# Patient Record
Sex: MALE | Race: WHITE | NOT HISPANIC OR LATINO | Employment: FULL TIME | URBAN - METROPOLITAN AREA
[De-identification: names, ages, dates, MRNs, and addresses within clinical notes are randomized per-mention and may not be internally consistent; named-entity substitution may affect disease eponyms.]

---

## 2023-06-09 LAB
ALANINE AMINOTRANSFERASE (SGPT) (U/L) IN SER/PLAS: 20 U/L (ref 10–52)
ALBUMIN (G/DL) IN SER/PLAS: 4.2 G/DL (ref 3.4–5)
ALBUMIN (MG/L) IN URINE: 206.5 MG/L
ALBUMIN/CREATININE (UG/MG) IN URINE: 109.3 UG/MG CRT (ref 0–30)
ALKALINE PHOSPHATASE (U/L) IN SER/PLAS: 58 U/L (ref 33–136)
ANION GAP IN SER/PLAS: 16 MMOL/L (ref 10–20)
APPEARANCE, URINE: CLEAR
ASPARTATE AMINOTRANSFERASE (SGOT) (U/L) IN SER/PLAS: 17 U/L (ref 9–39)
BASOPHILS (10*3/UL) IN BLOOD BY AUTOMATED COUNT: 0.09 X10E9/L (ref 0–0.1)
BASOPHILS/100 LEUKOCYTES IN BLOOD BY AUTOMATED COUNT: 1.1 % (ref 0–2)
BILIRUBIN TOTAL (MG/DL) IN SER/PLAS: 0.4 MG/DL (ref 0–1.2)
BILIRUBIN, URINE: NEGATIVE
BLOOD, URINE: NEGATIVE
CALCIDIOL (25 OH VITAMIN D3) (NG/ML) IN SER/PLAS: 46 NG/ML
CALCIUM (MG/DL) IN SER/PLAS: 9.3 MG/DL (ref 8.6–10.3)
CARBON DIOXIDE, TOTAL (MMOL/L) IN SER/PLAS: 25 MMOL/L (ref 21–32)
CHLORIDE (MMOL/L) IN SER/PLAS: 101 MMOL/L (ref 98–107)
CHOLESTEROL (MG/DL) IN SER/PLAS: 129 MG/DL (ref 0–199)
CHOLESTEROL IN HDL (MG/DL) IN SER/PLAS: 41.3 MG/DL
CHOLESTEROL IN LDL (MG/DL) IN SER/PLAS BY DIRECT ASSAY: 75 MG/DL (ref 0–129)
CHOLESTEROL/HDL RATIO: 3.1
COBALAMIN (VITAMIN B12) (PG/ML) IN SER/PLAS: 386 PG/ML (ref 211–911)
COLOR, URINE: YELLOW
CREATININE (MG/DL) IN SER/PLAS: 0.83 MG/DL (ref 0.5–1.3)
CREATININE (MG/DL) IN URINE: 189 MG/DL (ref 20–370)
EOSINOPHILS (10*3/UL) IN BLOOD BY AUTOMATED COUNT: 0.22 X10E9/L (ref 0–0.7)
EOSINOPHILS/100 LEUKOCYTES IN BLOOD BY AUTOMATED COUNT: 2.7 % (ref 0–6)
ERYTHROCYTE DISTRIBUTION WIDTH (RATIO) BY AUTOMATED COUNT: 13.2 % (ref 11.5–14.5)
ERYTHROCYTE MEAN CORPUSCULAR HEMOGLOBIN CONCENTRATION (G/DL) BY AUTOMATED: 33.6 G/DL (ref 32–36)
ERYTHROCYTE MEAN CORPUSCULAR VOLUME (FL) BY AUTOMATED COUNT: 93 FL (ref 80–100)
ERYTHROCYTES (10*6/UL) IN BLOOD BY AUTOMATED COUNT: 4.58 X10E12/L (ref 4.5–5.9)
ESTIMATED AVERAGE GLUCOSE FOR HBA1C: 117 MG/DL
GFR MALE: >90 ML/MIN/1.73M2
GLUCOSE (MG/DL) IN SER/PLAS: 96 MG/DL (ref 74–99)
GLUCOSE, URINE: NEGATIVE MG/DL
HEMATOCRIT (%) IN BLOOD BY AUTOMATED COUNT: 42.8 % (ref 41–52)
HEMOGLOBIN (G/DL) IN BLOOD: 14.4 G/DL (ref 13.5–17.5)
HEMOGLOBIN A1C/HEMOGLOBIN TOTAL IN BLOOD: 5.7 %
IMMATURE GRANULOCYTES/100 LEUKOCYTES IN BLOOD BY AUTOMATED COUNT: 0.8 % (ref 0–0.9)
KETONES, URINE: NEGATIVE MG/DL
LDL: 61 MG/DL (ref 0–99)
LEUKOCYTE ESTERASE, URINE: ABNORMAL
LEUKOCYTES (10*3/UL) IN BLOOD BY AUTOMATED COUNT: 8.2 X10E9/L (ref 4.4–11.3)
LYMPHOCYTES (10*3/UL) IN BLOOD BY AUTOMATED COUNT: 2.14 X10E9/L (ref 1.2–4.8)
LYMPHOCYTES/100 LEUKOCYTES IN BLOOD BY AUTOMATED COUNT: 26 % (ref 13–44)
MONOCYTES (10*3/UL) IN BLOOD BY AUTOMATED COUNT: 0.66 X10E9/L (ref 0.1–1)
MONOCYTES/100 LEUKOCYTES IN BLOOD BY AUTOMATED COUNT: 8 % (ref 2–10)
MUCUS, URINE: ABNORMAL /LPF
NEUTROPHILS (10*3/UL) IN BLOOD BY AUTOMATED COUNT: 5.06 X10E9/L (ref 1.2–7.7)
NEUTROPHILS/100 LEUKOCYTES IN BLOOD BY AUTOMATED COUNT: 61.4 % (ref 40–80)
NITRITE, URINE: NEGATIVE
PH, URINE: 6 (ref 5–8)
PLATELETS (10*3/UL) IN BLOOD AUTOMATED COUNT: 206 X10E9/L (ref 150–450)
POTASSIUM (MMOL/L) IN SER/PLAS: 3.7 MMOL/L (ref 3.5–5.3)
PROSTATE SPECIFIC AG (NG/ML) IN SER/PLAS: 4.48 NG/ML (ref 0–4)
PROTEIN TOTAL: 7.2 G/DL (ref 6.4–8.2)
PROTEIN, URINE: ABNORMAL MG/DL
RBC, URINE: 1 /HPF (ref 0–5)
SODIUM (MMOL/L) IN SER/PLAS: 138 MMOL/L (ref 136–145)
SPECIFIC GRAVITY, URINE: 1.02 (ref 1–1.03)
SQUAMOUS EPITHELIAL CELLS, URINE: 1 /HPF
THYROTROPIN (MIU/L) IN SER/PLAS BY DETECTION LIMIT <= 0.05 MIU/L: 1.45 MIU/L (ref 0.44–3.98)
TRIGLYCERIDE (MG/DL) IN SER/PLAS: 135 MG/DL (ref 0–149)
UREA NITROGEN (MG/DL) IN SER/PLAS: 13 MG/DL (ref 6–23)
UROBILINOGEN, URINE: <2 MG/DL (ref 0–1.9)
VLDL: 27 MG/DL (ref 0–40)
WBC, URINE: 19 /HPF (ref 0–5)

## 2023-11-27 ENCOUNTER — ANCILLARY PROCEDURE (OUTPATIENT)
Dept: RADIOLOGY | Facility: CLINIC | Age: 63
End: 2023-11-27
Payer: COMMERCIAL

## 2023-11-27 ENCOUNTER — LAB (OUTPATIENT)
Dept: LAB | Facility: LAB | Age: 63
End: 2023-11-27
Payer: COMMERCIAL

## 2023-11-27 DIAGNOSIS — M25.569 PAIN IN UNSPECIFIED KNEE: ICD-10-CM

## 2023-11-27 DIAGNOSIS — E11.9 TYPE 2 DIABETES MELLITUS WITHOUT COMPLICATIONS (MULTI): ICD-10-CM

## 2023-11-27 DIAGNOSIS — R97.20 ELEVATED PROSTATE SPECIFIC ANTIGEN (PSA): Primary | ICD-10-CM

## 2023-11-27 LAB
ANION GAP SERPL CALC-SCNC: 10 MMOL/L (ref 10–20)
BUN SERPL-MCNC: 17 MG/DL (ref 6–23)
CALCIUM SERPL-MCNC: 9 MG/DL (ref 8.6–10.3)
CHLORIDE SERPL-SCNC: 102 MMOL/L (ref 98–107)
CO2 SERPL-SCNC: 31 MMOL/L (ref 21–32)
CREAT SERPL-MCNC: 0.79 MG/DL (ref 0.5–1.3)
EST. AVERAGE GLUCOSE BLD GHB EST-MCNC: 103 MG/DL
GFR SERPL CREATININE-BSD FRML MDRD: >90 ML/MIN/1.73M*2
GLUCOSE SERPL-MCNC: 102 MG/DL (ref 74–99)
HBA1C MFR BLD: 5.2 %
POTASSIUM SERPL-SCNC: 3.9 MMOL/L (ref 3.5–5.3)
PSA SERPL-MCNC: 0.42 NG/ML
SODIUM SERPL-SCNC: 139 MMOL/L (ref 136–145)

## 2023-11-27 PROCEDURE — 73564 X-RAY EXAM KNEE 4 OR MORE: CPT | Mod: BILATERAL PROCEDURE | Performed by: STUDENT IN AN ORGANIZED HEALTH CARE EDUCATION/TRAINING PROGRAM

## 2023-11-27 PROCEDURE — 80048 BASIC METABOLIC PNL TOTAL CA: CPT

## 2023-11-27 PROCEDURE — 84153 ASSAY OF PSA TOTAL: CPT

## 2023-11-27 PROCEDURE — 73564 X-RAY EXAM KNEE 4 OR MORE: CPT | Mod: 50

## 2023-11-27 PROCEDURE — 83036 HEMOGLOBIN GLYCOSYLATED A1C: CPT

## 2023-11-27 PROCEDURE — 36415 COLL VENOUS BLD VENIPUNCTURE: CPT

## 2024-05-25 ENCOUNTER — LAB (OUTPATIENT)
Dept: LAB | Facility: LAB | Age: 64
End: 2024-05-25
Payer: COMMERCIAL

## 2024-05-25 DIAGNOSIS — Z13.29 ENCOUNTER FOR SCREENING FOR OTHER SUSPECTED ENDOCRINE DISORDER: ICD-10-CM

## 2024-05-25 DIAGNOSIS — R73.03 PREDIABETES: ICD-10-CM

## 2024-05-25 DIAGNOSIS — I10 ESSENTIAL (PRIMARY) HYPERTENSION: ICD-10-CM

## 2024-05-25 DIAGNOSIS — Z13.220 ENCOUNTER FOR SCREENING FOR LIPOID DISORDERS: ICD-10-CM

## 2024-05-25 DIAGNOSIS — E55.9 VITAMIN D DEFICIENCY, UNSPECIFIED: Primary | ICD-10-CM

## 2024-05-25 DIAGNOSIS — K21.9 GASTRO-ESOPHAGEAL REFLUX DISEASE WITHOUT ESOPHAGITIS: ICD-10-CM

## 2024-05-25 LAB
25(OH)D3 SERPL-MCNC: 40 NG/ML (ref 30–100)
ALBUMIN SERPL BCP-MCNC: 4.2 G/DL (ref 3.4–5)
ALP SERPL-CCNC: 68 U/L (ref 33–136)
ALT SERPL W P-5'-P-CCNC: 12 U/L (ref 10–52)
ANION GAP SERPL CALC-SCNC: 15 MMOL/L (ref 10–20)
APPEARANCE UR: CLEAR
AST SERPL W P-5'-P-CCNC: 13 U/L (ref 9–39)
BACTERIA #/AREA URNS AUTO: ABNORMAL /HPF
BASOPHILS # BLD AUTO: 0.08 X10*3/UL (ref 0–0.1)
BASOPHILS NFR BLD AUTO: 1.1 %
BILIRUB SERPL-MCNC: 0.5 MG/DL (ref 0–1.2)
BILIRUB UR STRIP.AUTO-MCNC: NEGATIVE MG/DL
BUN SERPL-MCNC: 23 MG/DL (ref 6–23)
CALCIUM SERPL-MCNC: 9.3 MG/DL (ref 8.6–10.3)
CHLORIDE SERPL-SCNC: 106 MMOL/L (ref 98–107)
CHOLEST SERPL-MCNC: 116 MG/DL (ref 0–199)
CHOLESTEROL/HDL RATIO: 2.4
CO2 SERPL-SCNC: 21 MMOL/L (ref 21–32)
COLOR UR: YELLOW
CREAT SERPL-MCNC: 0.99 MG/DL (ref 0.5–1.3)
CREAT UR-MCNC: 214.8 MG/DL (ref 20–370)
EGFRCR SERPLBLD CKD-EPI 2021: 86 ML/MIN/1.73M*2
EOSINOPHIL # BLD AUTO: 0.29 X10*3/UL (ref 0–0.7)
EOSINOPHIL NFR BLD AUTO: 4.1 %
ERYTHROCYTE [DISTWIDTH] IN BLOOD BY AUTOMATED COUNT: 12.9 % (ref 11.5–14.5)
EST. AVERAGE GLUCOSE BLD GHB EST-MCNC: 111 MG/DL
GLUCOSE SERPL-MCNC: 87 MG/DL (ref 74–99)
GLUCOSE UR STRIP.AUTO-MCNC: NEGATIVE MG/DL
HBA1C MFR BLD: 5.5 %
HCT VFR BLD AUTO: 38.8 % (ref 41–52)
HDLC SERPL-MCNC: 48.9 MG/DL
HGB BLD-MCNC: 13.3 G/DL (ref 13.5–17.5)
HOLD SPECIMEN: NORMAL
IMM GRANULOCYTES # BLD AUTO: 0.06 X10*3/UL (ref 0–0.7)
IMM GRANULOCYTES NFR BLD AUTO: 0.8 % (ref 0–0.9)
KETONES UR STRIP.AUTO-MCNC: NEGATIVE MG/DL
LDLC SERPL CALC-MCNC: 47 MG/DL
LEUKOCYTE ESTERASE UR QL STRIP.AUTO: ABNORMAL
LYMPHOCYTES # BLD AUTO: 2.27 X10*3/UL (ref 1.2–4.8)
LYMPHOCYTES NFR BLD AUTO: 31.7 %
MCH RBC QN AUTO: 32.4 PG (ref 26–34)
MCHC RBC AUTO-ENTMCNC: 34.3 G/DL (ref 32–36)
MCV RBC AUTO: 94 FL (ref 80–100)
MICROALBUMIN UR-MCNC: 17.6 MG/L
MICROALBUMIN/CREAT UR: 8.2 UG/MG CREAT
MONOCYTES # BLD AUTO: 0.51 X10*3/UL (ref 0.1–1)
MONOCYTES NFR BLD AUTO: 7.1 %
MUCOUS THREADS #/AREA URNS AUTO: ABNORMAL /LPF
NEUTROPHILS # BLD AUTO: 3.94 X10*3/UL (ref 1.2–7.7)
NEUTROPHILS NFR BLD AUTO: 55.2 %
NITRITE UR QL STRIP.AUTO: NEGATIVE
NON HDL CHOLESTEROL: 67 MG/DL (ref 0–149)
NRBC BLD-RTO: 0 /100 WBCS (ref 0–0)
PH UR STRIP.AUTO: 5 [PH]
PLATELET # BLD AUTO: 218 X10*3/UL (ref 150–450)
POTASSIUM SERPL-SCNC: 4.3 MMOL/L (ref 3.5–5.3)
PROT SERPL-MCNC: 6.7 G/DL (ref 6.4–8.2)
PROT UR STRIP.AUTO-MCNC: NEGATIVE MG/DL
RBC # BLD AUTO: 4.11 X10*6/UL (ref 4.5–5.9)
RBC # UR STRIP.AUTO: NEGATIVE /UL
RBC #/AREA URNS AUTO: ABNORMAL /HPF
SODIUM SERPL-SCNC: 138 MMOL/L (ref 136–145)
SP GR UR STRIP.AUTO: 1.02
TRIGL SERPL-MCNC: 99 MG/DL (ref 0–149)
TSH SERPL-ACNC: 1.46 MIU/L (ref 0.44–3.98)
UROBILINOGEN UR STRIP.AUTO-MCNC: <2 MG/DL
VIT B12 SERPL-MCNC: 239 PG/ML (ref 211–911)
VLDL: 20 MG/DL (ref 0–40)
WBC # BLD AUTO: 7.2 X10*3/UL (ref 4.4–11.3)
WBC #/AREA URNS AUTO: ABNORMAL /HPF

## 2024-05-25 PROCEDURE — 80061 LIPID PANEL: CPT

## 2024-05-25 PROCEDURE — 85025 COMPLETE CBC W/AUTO DIFF WBC: CPT

## 2024-05-25 PROCEDURE — 36415 COLL VENOUS BLD VENIPUNCTURE: CPT

## 2024-05-25 PROCEDURE — 81001 URINALYSIS AUTO W/SCOPE: CPT

## 2024-05-25 PROCEDURE — 87086 URINE CULTURE/COLONY COUNT: CPT

## 2024-05-25 PROCEDURE — 83036 HEMOGLOBIN GLYCOSYLATED A1C: CPT

## 2024-05-25 PROCEDURE — 84443 ASSAY THYROID STIM HORMONE: CPT

## 2024-05-25 PROCEDURE — 82607 VITAMIN B-12: CPT

## 2024-05-25 PROCEDURE — 82043 UR ALBUMIN QUANTITATIVE: CPT

## 2024-05-25 PROCEDURE — 82306 VITAMIN D 25 HYDROXY: CPT

## 2024-05-25 PROCEDURE — 82570 ASSAY OF URINE CREATININE: CPT

## 2024-05-25 PROCEDURE — 80053 COMPREHEN METABOLIC PANEL: CPT

## 2024-05-26 LAB — BACTERIA UR CULT: NO GROWTH

## 2024-07-24 ENCOUNTER — APPOINTMENT (OUTPATIENT)
Dept: ORTHOPEDIC SURGERY | Facility: CLINIC | Age: 64
End: 2024-07-24
Payer: COMMERCIAL

## 2024-08-09 ENCOUNTER — APPOINTMENT (OUTPATIENT)
Dept: ORTHOPEDIC SURGERY | Facility: CLINIC | Age: 64
End: 2024-08-09
Payer: COMMERCIAL

## 2024-08-09 ENCOUNTER — HOSPITAL ENCOUNTER (OUTPATIENT)
Dept: RADIOLOGY | Facility: HOSPITAL | Age: 64
Discharge: HOME | End: 2024-08-09
Payer: COMMERCIAL

## 2024-08-09 DIAGNOSIS — M25.561 RIGHT KNEE PAIN, UNSPECIFIED CHRONICITY: ICD-10-CM

## 2024-08-09 DIAGNOSIS — M17.11 PRIMARY OSTEOARTHRITIS OF RIGHT KNEE: Primary | ICD-10-CM

## 2024-08-09 PROCEDURE — 73564 X-RAY EXAM KNEE 4 OR MORE: CPT | Mod: RT

## 2024-08-09 PROCEDURE — 99204 OFFICE O/P NEW MOD 45 MIN: CPT | Performed by: ORTHOPAEDIC SURGERY

## 2024-08-11 NOTE — PROGRESS NOTES
New patient to me 64 gentleman presents complaining increasing right-sided knee pain he states had a long history of knee trouble he is having severe impairment of bodily function related to his right knee he has done extensive conservative treatment has been working with pain management he has been taking narcotics he is a cortisone consent Gelsyn he said extensive treatment pain is getting progressively worse affecting his activities of daily living.    Location of pain: Anterior medial aspect knee right  Quality of pain: Chronic pain for many years getting progressively worse  Modifying factors: Worse when he stands for prolonged period turn or gets up and down procedure position better with rest  Associated signs and symptoms: A lot of popping clicking grinding and swelling  Previous treatment: Has had multiple cortisone injections as well as gel injections is taken multiple medications including narcotics he had a knee scope back in 1994.        The patient's past medical history, family history, social history, and review of systems were documented on the patient's medical intake form.  The medical intake form was reviewed and scanned into the electronic medical record for future use.  History is otherwise negative except as stated in the HPI.    Physical exam    General: Alert and oriented to place, person, and time.  No acute distress and breathing comfortably; pleasant and cooperative with the examination.  HEENT: Head is normocephalic and atraumatic.  Neck: Supple, no visible swelling.  Cardiovascular: Good perfusion to the affected extremity.  Lungs: No audible wheezing or labored breathing.  Abdomen: Nondistended  HEME/Lymph : No visible abnormalities bilateral lower extremity    Extremity:  The affected knee was examined and inspected and was tender to touch along the medial aspect.  The patient has catching/locking and occasional mechanical symptoms.  The skin was intact without breakdown or open  wounds.  There was a mild Sharon exam seen with mild evidence of instability and weakness in the collateral ligaments with laxity to varus valgus stress and in the anterior posterior plane.  There was a negative Lachman's test, pivot shift test, and posterior drawer sign.  There was no foot drop, numbness or tingling.  Sensation, reflexes, and pulses in the foot and ankle were present.  There was an effusion but range of motion was good and straight leg raise testing was normal.   The patient had the ability to bear weight but with discomfort.  The patient's gait was antalgic secondary to the discomfort. Knee range of motion was 5-115    Diagnostics:  X-rays are obtained today and reviewed by myself showing severe knee arthritis see dictated x-ray report    No results found.       Procedures  [none   ]    Assessment:  Severe osteoarthritis knee right    Treatment plan:  1.  The natural history of the condition and its associated treatment alternatives including surgical and nonsurgical options were discussed with the patient at length.  2.  Patient with severe impairment of bodily function related to his right knee arthritis refractory to conservative treatment would like to proceed with right total knee replacement.  The procedures risk benefits treatment alternatives and postoperative course were discussed and he understands wishes to proceed.  He is got some risk factors including elevated weight and BMI which he understands and he is can to continue to work on he is already lost significant weight he also has been taking narcotics for extended period of time and is working with pain management to cut down on that.  Patient has failed all forms of conservative treatment.  The joint pain is significantly affecting quality of life and activities of daily living.    The patient has pain in the joint that is increased with activity and weightbearing, and walks with an antalgic gait.  The pain is interfering with  activities of daily living.  Patient has limited range of motion and pain with passive range of motion.  X-rays demonstrate joint space narrowing, subchondral sclerosis and osteophyte formation.  Symptoms are not improving with medication, physical therapy or supportive device for a period of at least 3 months.  Weight loss and smoking cessation have been discussed with the patient.  Patient advised on when to cease smoking 6-8 weeks before the procedure.      Patient would like to go and schedule joint replacement surgery.  The procedure its risks, benefits, and treatment alternatives were discussed at length and patient would like to proceed.  Patient is going to schedule the procedure at their earliest convenience and see me back for a preop visit just prior.  Preadmission testing, medical checkup, and insurance authorization will be performed in the meantime.  Patient understands a joint replacement surgery is a last resort, although a very successful operation results are not guaranteed.  3. [   ]  4.  All of the patient's questions were answered.    Orders Placed This Encounter    XR knee right 4+ views       This note was prepared using voice recognition software.  The details of this note are correct and have been reviewed, and corrected to the best of my ability.  Some grammatical areas may persist related to the Dragon software    Stanton Rai MD  Senior Attending Physician  [facility]  Orthopedic [place]    [phone]

## 2024-10-03 ENCOUNTER — HOSPITAL ENCOUNTER (OUTPATIENT)
Dept: PREADMISSION TESTING | Age: 64
Discharge: HOME | End: 2024-10-07
Payer: COMMERCIAL

## 2024-10-03 VITALS
SYSTOLIC BLOOD PRESSURE: 142 MMHG | RESPIRATION RATE: 18 BRPM | WEIGHT: 307 LBS | BODY MASS INDEX: 45.47 KG/M2 | HEART RATE: 69 BPM | TEMPERATURE: 98 F | OXYGEN SATURATION: 98 % | DIASTOLIC BLOOD PRESSURE: 80 MMHG | HEIGHT: 69 IN

## 2024-10-03 LAB
ALBUMIN SERPL-MCNC: 4.2 G/DL (ref 3.5–4.6)
ALP SERPL-CCNC: 88 U/L (ref 35–104)
ALT SERPL-CCNC: 8 U/L (ref 0–41)
ANION GAP SERPL CALCULATED.3IONS-SCNC: 13 MEQ/L (ref 9–15)
APTT PPP: 29 SEC (ref 24.4–36.8)
AST SERPL-CCNC: 10 U/L (ref 0–40)
BACTERIA URNS QL MICRO: NEGATIVE /HPF
BILIRUB SERPL-MCNC: 0.6 MG/DL (ref 0.2–0.7)
BILIRUB UR QL STRIP: NEGATIVE
BUN SERPL-MCNC: 10 MG/DL (ref 8–23)
CALCIUM SERPL-MCNC: 9.4 MG/DL (ref 8.5–9.9)
CHLORIDE SERPL-SCNC: 101 MEQ/L (ref 95–107)
CLARITY UR: CLEAR
CO2 SERPL-SCNC: 27 MEQ/L (ref 20–31)
COLOR UR: YELLOW
CREAT SERPL-MCNC: 0.67 MG/DL (ref 0.7–1.2)
EPI CELLS #/AREA URNS AUTO: ABNORMAL /HPF (ref 0–5)
GLOBULIN SER CALC-MCNC: 3 G/DL (ref 2.3–3.5)
GLUCOSE SERPL-MCNC: 90 MG/DL (ref 70–99)
GLUCOSE UR STRIP-MCNC: NEGATIVE MG/DL
HGB UR QL STRIP: NEGATIVE
HYALINE CASTS #/AREA URNS AUTO: ABNORMAL /HPF (ref 0–5)
INR PPP: 1
KETONES UR STRIP-MCNC: NEGATIVE MG/DL
LEUKOCYTE ESTERASE UR QL STRIP: ABNORMAL
NITRITE UR QL STRIP: NEGATIVE
PH UR STRIP: 6.5 [PH] (ref 5–9)
POTASSIUM SERPL-SCNC: 3.1 MEQ/L (ref 3.4–4.9)
PROT SERPL-MCNC: 7.2 G/DL (ref 6.3–8)
PROT UR STRIP-MCNC: ABNORMAL MG/DL
PROTHROMBIN TIME: 13.3 SEC (ref 12.3–14.9)
RBC #/AREA URNS AUTO: ABNORMAL /HPF (ref 0–5)
REASON FOR REJECTION: NORMAL
REJECTED TEST: NORMAL
SODIUM SERPL-SCNC: 141 MEQ/L (ref 135–144)
SP GR UR STRIP: 1.02 (ref 1–1.03)
URINE REFLEX TO CULTURE: YES
UROBILINOGEN UR STRIP-ACNC: 0.2 E.U./DL
WBC #/AREA URNS AUTO: ABNORMAL /HPF (ref 0–5)

## 2024-10-03 PROCEDURE — 93005 ELECTROCARDIOGRAM TRACING: CPT | Performed by: ORTHOPAEDIC SURGERY

## 2024-10-03 PROCEDURE — 81001 URINALYSIS AUTO W/SCOPE: CPT

## 2024-10-03 PROCEDURE — 85610 PROTHROMBIN TIME: CPT

## 2024-10-03 PROCEDURE — 85730 THROMBOPLASTIN TIME PARTIAL: CPT

## 2024-10-03 PROCEDURE — 87086 URINE CULTURE/COLONY COUNT: CPT

## 2024-10-03 PROCEDURE — 80053 COMPREHEN METABOLIC PANEL: CPT

## 2024-10-03 PROCEDURE — 87641 MR-STAPH DNA AMP PROBE: CPT

## 2024-10-03 RX ORDER — LISINOPRIL 40 MG/1
40 TABLET ORAL DAILY
COMMUNITY

## 2024-10-03 RX ORDER — ACETAMINOPHEN 500 MG
500 TABLET ORAL EVERY 6 HOURS PRN
COMMUNITY

## 2024-10-03 RX ORDER — MELOXICAM 15 MG/1
15 TABLET ORAL DAILY
COMMUNITY

## 2024-10-03 RX ORDER — DIMENHYDRINATE 50 MG
TABLET ORAL
COMMUNITY

## 2024-10-03 RX ORDER — SPIRONOLACTONE 100 %
50 POWDER (GRAM) MISCELLANEOUS DAILY
COMMUNITY

## 2024-10-04 ENCOUNTER — OFFICE VISIT (OUTPATIENT)
Dept: ORTHOPEDIC SURGERY | Facility: CLINIC | Age: 64
End: 2024-10-04
Payer: COMMERCIAL

## 2024-10-04 DIAGNOSIS — M17.11 PRIMARY OSTEOARTHRITIS OF RIGHT KNEE: Primary | ICD-10-CM

## 2024-10-04 LAB
BACTERIA UR CULT: NORMAL
EKG ATRIAL RATE: 61 BPM
EKG P AXIS: 21 DEGREES
EKG P-R INTERVAL: 182 MS
EKG Q-T INTERVAL: 460 MS
EKG QRS DURATION: 102 MS
EKG QTC CALCULATION (BAZETT): 463 MS
EKG R AXIS: -39 DEGREES
EKG T AXIS: -6 DEGREES
EKG VENTRICULAR RATE: 61 BPM
MRSA, DNA, NASAL: NEGATIVE
SPECIMEN DESCRIPTION: NORMAL

## 2024-10-04 PROCEDURE — 99211 OFF/OP EST MAY X REQ PHY/QHP: CPT | Performed by: PHYSICIAN ASSISTANT

## 2024-10-04 NOTE — PROGRESS NOTES
Subjective    Patient ID: Pedro    Chief Complaint:   Chief Complaint   Patient presents with    Right Knee - Pre-op Exam     TKA 10/10/2014     This patient has pain in the knee that is worsening.  The pain increases with activity and with weightbearing, and interferes with daily activities.  There is pain with range of motion, limited range of motion, crepitus and swelling.  The x-ray demonstrates joint space narrowing, osteophyte formation, and subchondral sclerosis.  The symptoms have worsened in spite of extensive medical treatment, therapy, and external support over at least the past 3 months.     This is the preop visit to discuss the risks and benefits of the total knee replacement surgery.  These risks were fully explained to the patient.  With this, and any surgery, infection is a risk, this is usually 1 to 2%, even higher in diabetics, persons with rheumatoid arthritis, previous surgeries, on oral steroid medications, and obesity.  All of these issues were properly addressed.  We always assure all sterile techniques will be followed.  Patient will also need to be on antibiotics for the next 2 years for any minor surgery, even dental work.  For high risk patients, this will be for lifetime.  Severe infections may require removal of the prosthesis.     It was also explained to the patient that there will be some minor blood loss during the procedure and a blood transfusion may be recommended if medically necessary.  It is also noted that with knee surgery a tourniquet is applied to the lower extremity to limit blood loss during the procedure.     Pulmonary embolism and blood clots are also discussed with the patient.  We discussed that these can be fatal complications to the surgery.  It is explained to the patient that the use of thromboembolic stockings, foot pumps, incentive spirometer's, early mobility, and the use of blood thinners for period of time is the standard of care for prevention of blood  clots.     Patient's preoperative range of motion is 5-105 degrees.  It is explained to the patient that this type of surgery is to decrease arthritis pain and sometimes stiffness may occur.  It is important that the patient go to physical therapy postoperatively.  It is also stated that occasionally we will have to manipulate the knee if pain and stiffness persists.     Loosening and wear of the prosthesis are also discussed.  The prosthesis normally last between 12 and 15 years.  Revisions may be more complicated and with higher risk.     Fractures, though rare, may also occur intraoperatively.  These fractures may be to the tibia or to the femur.  There may be nerve or arterial injuries as well and these are discussed in detail.     Lastly, the benefits of spinal anesthesia were explained to the patient.     All of the patient's questions and concerns were answered.     This note was prepared using voice recognition software.  The details of this note are correct and have been reviewed, and corrected to the best of my ability.  Some grammatical areas may persist related to the Dragon software     Hany Foreman PA-C     [phone]

## 2024-10-08 ENCOUNTER — APPOINTMENT (OUTPATIENT)
Dept: CARDIOLOGY | Facility: CLINIC | Age: 64
End: 2024-10-08
Payer: COMMERCIAL

## 2024-10-08 VITALS
BODY MASS INDEX: 41.83 KG/M2 | DIASTOLIC BLOOD PRESSURE: 86 MMHG | WEIGHT: 308.4 LBS | HEART RATE: 72 BPM | SYSTOLIC BLOOD PRESSURE: 140 MMHG

## 2024-10-08 DIAGNOSIS — Z82.49 FAMILY HISTORY OF ISCHEMIC HEART DISEASE: ICD-10-CM

## 2024-10-08 DIAGNOSIS — Z01.818 PRE-OPERATIVE CLEARANCE: ICD-10-CM

## 2024-10-08 DIAGNOSIS — R94.31 ABNORMAL EKG: ICD-10-CM

## 2024-10-08 DIAGNOSIS — E78.2 MIXED HYPERLIPIDEMIA: ICD-10-CM

## 2024-10-08 DIAGNOSIS — Z87.891 FORMER SMOKER: ICD-10-CM

## 2024-10-08 DIAGNOSIS — E66.01 MORBID OBESITY (MULTI): ICD-10-CM

## 2024-10-08 PROBLEM — R06.02 SHORTNESS OF BREATH: Status: ACTIVE | Noted: 2024-10-08

## 2024-10-08 PROCEDURE — 1036F TOBACCO NON-USER: CPT | Performed by: INTERNAL MEDICINE

## 2024-10-08 PROCEDURE — 99204 OFFICE O/P NEW MOD 45 MIN: CPT | Performed by: INTERNAL MEDICINE

## 2024-10-08 RX ORDER — OMEPRAZOLE 20 MG/1
20 CAPSULE, DELAYED RELEASE ORAL DAILY
COMMUNITY

## 2024-10-08 RX ORDER — PENTAZOCINE HYDROCHLORIDE AND NALOXONE HYDROCHLORIDE 50; .5 MG/1; MG/1
1 TABLET ORAL 2 TIMES DAILY PRN
COMMUNITY
Start: 2024-08-28

## 2024-10-08 RX ORDER — CHOLECALCIFEROL (VITAMIN D3) 25 MCG
TABLET,CHEWABLE ORAL DAILY
COMMUNITY

## 2024-10-08 RX ORDER — ATORVASTATIN CALCIUM 40 MG/1
40 TABLET, FILM COATED ORAL DAILY
COMMUNITY

## 2024-10-08 RX ORDER — METFORMIN HYDROCHLORIDE 500 MG/1
1 TABLET ORAL
COMMUNITY

## 2024-10-08 RX ORDER — MELOXICAM 15 MG/1
15 TABLET ORAL DAILY PRN
COMMUNITY
Start: 2024-09-25

## 2024-10-08 RX ORDER — LISINOPRIL 40 MG/1
1 TABLET ORAL DAILY
COMMUNITY
Start: 2022-02-18

## 2024-10-08 RX ORDER — ACETAMINOPHEN 500 MG
1 TABLET ORAL EVERY 6 HOURS PRN
COMMUNITY

## 2024-10-08 RX ORDER — SPIRONOLACTONE 50 MG/1
50 TABLET, FILM COATED ORAL DAILY
COMMUNITY

## 2024-10-08 RX ORDER — POTASSIUM CHLORIDE 750 MG/1
2 TABLET, EXTENDED RELEASE ORAL
COMMUNITY
Start: 2024-10-03

## 2024-10-08 RX ORDER — NIACIN (INOSITOL NIACINATE) 400(500MG)
CAPSULE ORAL
COMMUNITY

## 2024-10-08 RX ORDER — SULFAMETHOXAZOLE AND TRIMETHOPRIM 800; 160 MG/1; MG/1
1 TABLET ORAL
COMMUNITY
Start: 2024-10-03

## 2024-10-08 RX ORDER — NYSTATIN AND TRIAMCINOLONE ACETONIDE 100000; 1 [USP'U]/G; MG/G
CREAM TOPICAL
COMMUNITY
Start: 2024-07-02

## 2024-10-08 NOTE — PROGRESS NOTES
Referred by Dr. Barnes ref. provider found provider found for   Chief Complaint   Patient presents with    New Patient Visit     POC - Dr. Rai 10/10/2024        History of Present Illness  Pedro Lott is a 64 y.o. year old male patient is here for preop clearance for knee replacement.  He is morbidly obese male with remote history of tobacco abuse history of hyperlipidemia, history of diabetes.  He did have a history of hypertension as well.  The patient himself stated that he has very poor functional capacity he is unable to walk because of his knee problem and morbid obesity he does get out of breath with exertion.  He was evaluated for preop test which showed EKG had sinus rhythm with PVC and full R wave progression in lateral leads.  Patient did not have any clinical evidence of cardiac issues in the past.  Fortunately his surgery is scheduled for the day after tomorrow.  I had a lengthy discussion with the patient today that he is at cardiac risk because of poor functional capacity morbid obesity and multiple risk factors for coronary disease.  Do need to risk stratify him with a nuclear stress test and echo however the time is very limited and will we are seeing him only 48 hours prior to surgery.  I told him that to be on the safe side that we should do the test regardless whether we postpone the surgery or not.  The patient understood.  Based on the results further recommendation will be made    Past Medical History  History reviewed. No pertinent past medical history.    Social History  Social History     Tobacco Use    Smoking status: Former     Current packs/day: 0.00     Average packs/day: 2.5 packs/day for 15.2 years (38.1 ttl pk-yrs)     Types: Cigarettes     Start date:      Quit date: 2004     Years since quittin.5    Smokeless tobacco: Never   Substance Use Topics    Alcohol use: Yes     Alcohol/week: 20.0 standard drinks of alcohol     Types: 20 Standard drinks or equivalent per  week    Drug use: Not Currently       Family History     Family History   Problem Relation Name Age of Onset    Diabetes Mother      Heart attack Father         Review of Systems  As per HPI, all other systems reviewed and negative.    Allergies:  No Known Allergies     Outpatient Medications:  Current Outpatient Medications   Medication Instructions    acetaminophen (Tylenol) 500 mg tablet 1 tablet, oral, Every 6 hours PRN    atorvastatin (LIPITOR) 40 mg, oral, Daily    coenzyme Q10-vitamin E 100-5 mg-unit capsule oral    cyanocobalamin, vitamin B-12, 1,000 mcg capsule oral, Daily    lisinopril 40 mg tablet 1 tablet, oral, Daily    meloxicam (MOBIC) 15 mg, oral, Daily PRN    metFORMIN (Glucophage) 500 mg tablet 1 tablet, oral, 2 times daily before meals    nystatin-triamcinolone (Mycolog II) cream apply to the affected area(s) twice daily    omeprazole (PRILOSEC) 20 mg, oral, Daily    pentazocine-naloxone (Talwin NX) 50-0.5 mg tablet 1 tablet, oral, 2 times daily PRN    potassium chloride CR 10 mEq ER tablet 2 tablets, oral, Daily (0630)    spironolactone (ALDACTONE) 50 mg, oral, Daily    sulfamethoxazole-trimethoprim (Bactrim DS) 800-160 mg tablet 1 tablet, oral, Every 12 hours scheduled (0630,1830)         Vitals:  Vitals:    10/08/24 1201   BP: 140/86   Pulse: 72       Physical Exam:  Physical Exam  Constitutional:       Appearance: Normal appearance.   HENT:      Head: Normocephalic and atraumatic.   Eyes:      Extraocular Movements: Extraocular movements intact.      Pupils: Pupils are equal, round, and reactive to light.   Cardiovascular:      Rate and Rhythm: Normal rate and regular rhythm.      Pulses: Normal pulses.      Heart sounds: Normal heart sounds.   Pulmonary:      Effort: Pulmonary effort is normal.      Breath sounds: Normal breath sounds.   Abdominal:      General: Abdomen is flat.      Palpations: Abdomen is soft.   Musculoskeletal:      Right lower leg: No edema.      Left lower leg: No edema.    Skin:     General: Skin is warm and dry.   Neurological:      General: No focal deficit present.      Mental Status: He is alert and oriented to person, place, and time.             Assessment/Plan   Diagnoses and all orders for this visit:  Pre-operative clearance  Mixed hyperlipidemia  Morbid obesity (Multi)  Former smoker          Sumaya Hua MD [facility]  Interventional Cardiology   of [facility]     Thank you for allowing me to participate in the care of this patient. Please do not hesitate to contact me with any further questions or concerns.

## 2024-10-08 NOTE — PATIENT INSTRUCTIONS
Patient does not need a follow up with Dr. Sumaya Hua MD [facility]     Office will arrange Nuclear Lexiscan Stress Test and Echo in near future to clear you for surgery.   These orders are put in stat for you to arrange.   Will call with results.     No other changes today.   Continue same medications and treatments.   Patient educated on proper medication use.   Patient educated on risk factor modification.   Please bring any lab results from other providers / physicians to your next appointment.     Please bring all medicines, vitamins, and herbal supplements with you when you come to the office.     Prescriptions will not be filled unless you are compliant with your follow up appointments or have a follow up appointment scheduled as per instruction of your physician. Refills should be requested at the time of your visit.    IDevon RN am scribing for and in the presence of Dr. Sumaya Hua MD [facility]

## 2024-10-09 ENCOUNTER — APPOINTMENT (OUTPATIENT)
Dept: RADIOLOGY | Facility: CLINIC | Age: 64
End: 2024-10-09
Payer: COMMERCIAL

## 2024-10-09 ENCOUNTER — APPOINTMENT (OUTPATIENT)
Dept: CARDIOLOGY | Facility: CLINIC | Age: 64
End: 2024-10-09
Payer: COMMERCIAL

## 2024-10-09 ENCOUNTER — HOSPITAL ENCOUNTER (OUTPATIENT)
Dept: RADIOLOGY | Facility: CLINIC | Age: 64
Discharge: HOME | End: 2024-10-09
Payer: COMMERCIAL

## 2024-10-09 ENCOUNTER — HOSPITAL ENCOUNTER (OUTPATIENT)
Dept: CARDIOLOGY | Facility: CLINIC | Age: 64
Discharge: HOME | End: 2024-10-09
Payer: COMMERCIAL

## 2024-10-09 DIAGNOSIS — R94.31 ABNORMAL EKG: ICD-10-CM

## 2024-10-09 DIAGNOSIS — Z82.49 FAMILY HISTORY OF CORONARY ARTERY DISEASE: ICD-10-CM

## 2024-10-09 DIAGNOSIS — Z82.49 FAMILY HISTORY OF ISCHEMIC HEART DISEASE: ICD-10-CM

## 2024-10-09 DIAGNOSIS — Z01.818 PRE-OPERATIVE CLEARANCE: ICD-10-CM

## 2024-10-09 DIAGNOSIS — E66.01 MORBID OBESITY (MULTI): ICD-10-CM

## 2024-10-09 DIAGNOSIS — Z01.818 PRE-OPERATIVE CLEARANCE: Primary | ICD-10-CM

## 2024-10-09 PROCEDURE — A9502 TC99M TETROFOSMIN: HCPCS | Performed by: INTERNAL MEDICINE

## 2024-10-09 PROCEDURE — 2500000004 HC RX 250 GENERAL PHARMACY W/ HCPCS (ALT 636 FOR OP/ED): Performed by: INTERNAL MEDICINE

## 2024-10-09 PROCEDURE — 93017 CV STRESS TEST TRACING ONLY: CPT

## 2024-10-09 PROCEDURE — 93306 TTE W/DOPPLER COMPLETE: CPT

## 2024-10-09 PROCEDURE — 78452 HT MUSCLE IMAGE SPECT MULT: CPT

## 2024-10-09 PROCEDURE — 3430000001 HC RX 343 DIAGNOSTIC RADIOPHARMACEUTICALS: Performed by: INTERNAL MEDICINE

## 2024-10-09 RX ORDER — REGADENOSON 0.08 MG/ML
0.4 INJECTION, SOLUTION INTRAVENOUS ONCE
Status: COMPLETED | OUTPATIENT
Start: 2024-10-09 | End: 2024-10-09

## 2024-10-10 ENCOUNTER — APPOINTMENT (OUTPATIENT)
Dept: RADIOLOGY | Facility: CLINIC | Age: 64
End: 2024-10-10
Payer: COMMERCIAL

## 2024-10-11 ENCOUNTER — HOSPITAL ENCOUNTER (OUTPATIENT)
Dept: RADIOLOGY | Facility: CLINIC | Age: 64
Discharge: HOME | End: 2024-10-11
Payer: COMMERCIAL

## 2024-10-11 PROCEDURE — A9502 TC99M TETROFOSMIN: HCPCS | Performed by: INTERNAL MEDICINE

## 2024-10-11 PROCEDURE — 3430000001 HC RX 343 DIAGNOSTIC RADIOPHARMACEUTICALS: Performed by: INTERNAL MEDICINE

## 2024-10-24 ENCOUNTER — TELEPHONE (OUTPATIENT)
Dept: CARDIOLOGY | Facility: CLINIC | Age: 64
End: 2024-10-24
Payer: COMMERCIAL

## 2024-10-30 ENCOUNTER — APPOINTMENT (OUTPATIENT)
Dept: ORTHOPEDIC SURGERY | Facility: CLINIC | Age: 64
End: 2024-10-30
Payer: COMMERCIAL

## 2024-11-22 ENCOUNTER — LAB (OUTPATIENT)
Dept: LAB | Facility: LAB | Age: 64
End: 2024-11-22
Payer: COMMERCIAL

## 2024-11-22 DIAGNOSIS — E11.9 TYPE 2 DIABETES MELLITUS WITHOUT COMPLICATIONS (MULTI): Primary | ICD-10-CM

## 2024-11-22 LAB
ANION GAP SERPL CALC-SCNC: 13 MMOL/L (ref 10–20)
BUN SERPL-MCNC: 18 MG/DL (ref 6–23)
CALCIUM SERPL-MCNC: 9.1 MG/DL (ref 8.6–10.3)
CHLORIDE SERPL-SCNC: 105 MMOL/L (ref 98–107)
CO2 SERPL-SCNC: 26 MMOL/L (ref 21–32)
CREAT SERPL-MCNC: 0.81 MG/DL (ref 0.5–1.3)
EGFRCR SERPLBLD CKD-EPI 2021: >90 ML/MIN/1.73M*2
EST. AVERAGE GLUCOSE BLD GHB EST-MCNC: 103 MG/DL
GLUCOSE SERPL-MCNC: 82 MG/DL (ref 74–99)
HBA1C MFR BLD: 5.2 %
POTASSIUM SERPL-SCNC: 4.2 MMOL/L (ref 3.5–5.3)
SODIUM SERPL-SCNC: 140 MMOL/L (ref 136–145)

## 2024-11-22 PROCEDURE — 36415 COLL VENOUS BLD VENIPUNCTURE: CPT

## 2024-11-22 PROCEDURE — 80048 BASIC METABOLIC PNL TOTAL CA: CPT

## 2024-11-22 PROCEDURE — 83036 HEMOGLOBIN GLYCOSYLATED A1C: CPT

## 2024-12-05 ENCOUNTER — HOSPITAL ENCOUNTER (OUTPATIENT)
Dept: PREADMISSION TESTING | Age: 64
Discharge: HOME | End: 2024-12-09
Payer: COMMERCIAL

## 2024-12-05 VITALS
RESPIRATION RATE: 14 BRPM | HEART RATE: 65 BPM | TEMPERATURE: 97.3 F | DIASTOLIC BLOOD PRESSURE: 62 MMHG | WEIGHT: 301.2 LBS | BODY MASS INDEX: 43.12 KG/M2 | HEIGHT: 70 IN | SYSTOLIC BLOOD PRESSURE: 102 MMHG | OXYGEN SATURATION: 98 %

## 2024-12-05 LAB
ABO/RH: NORMAL
ALBUMIN SERPL-MCNC: 4.2 G/DL (ref 3.5–4.6)
ALP SERPL-CCNC: 83 U/L (ref 35–104)
ALT SERPL-CCNC: 8 U/L (ref 0–41)
ANION GAP SERPL CALCULATED.3IONS-SCNC: 15 MEQ/L (ref 9–15)
ANTIBODY SCREEN: NORMAL
AST SERPL-CCNC: 10 U/L (ref 0–40)
BACTERIA URNS QL MICRO: NEGATIVE /HPF
BASOPHILS # BLD: 0.1 K/UL (ref 0–0.2)
BASOPHILS NFR BLD: 0.9 %
BILIRUB SERPL-MCNC: 0.5 MG/DL (ref 0.2–0.7)
BILIRUB UR QL STRIP: NEGATIVE
BUN SERPL-MCNC: 19 MG/DL (ref 8–23)
CALCIUM SERPL-MCNC: 9.6 MG/DL (ref 8.5–9.9)
CHLORIDE SERPL-SCNC: 103 MEQ/L (ref 95–107)
CLARITY UR: CLEAR
CO2 SERPL-SCNC: 23 MEQ/L (ref 20–31)
COLOR UR: YELLOW
CREAT SERPL-MCNC: 0.81 MG/DL (ref 0.7–1.2)
EOSINOPHIL # BLD: 0.3 K/UL (ref 0–0.7)
EOSINOPHIL NFR BLD: 2.9 %
EPI CELLS #/AREA URNS AUTO: NORMAL /HPF (ref 0–5)
ERYTHROCYTE [DISTWIDTH] IN BLOOD BY AUTOMATED COUNT: 13.2 % (ref 11.5–14.5)
GLOBULIN SER CALC-MCNC: 2.8 G/DL (ref 2.3–3.5)
GLUCOSE SERPL-MCNC: 78 MG/DL (ref 70–99)
GLUCOSE UR STRIP-MCNC: NEGATIVE MG/DL
HCT VFR BLD AUTO: 39.1 % (ref 42–52)
HGB BLD-MCNC: 13.2 G/DL (ref 14–18)
HGB UR QL STRIP: NEGATIVE
HYALINE CASTS #/AREA URNS AUTO: NORMAL /HPF (ref 0–5)
KETONES UR STRIP-MCNC: NEGATIVE MG/DL
LEUKOCYTE ESTERASE UR QL STRIP: ABNORMAL
LYMPHOCYTES # BLD: 2.4 K/UL (ref 1–4.8)
LYMPHOCYTES NFR BLD: 26.8 %
MCH RBC QN AUTO: 31.9 PG (ref 27–31.3)
MCHC RBC AUTO-ENTMCNC: 33.8 % (ref 33–37)
MCV RBC AUTO: 94.4 FL (ref 79–92.2)
MONOCYTES # BLD: 0.7 K/UL (ref 0.2–0.8)
MONOCYTES NFR BLD: 7.7 %
NEUTROPHILS # BLD: 5.4 K/UL (ref 1.4–6.5)
NEUTS SEG NFR BLD: 61 %
NITRITE UR QL STRIP: NEGATIVE
PH UR STRIP: 5.5 [PH] (ref 5–9)
PLATELET # BLD AUTO: 219 K/UL (ref 130–400)
POTASSIUM SERPL-SCNC: 3.5 MEQ/L (ref 3.4–4.9)
PROT SERPL-MCNC: 7 G/DL (ref 6.3–8)
PROT UR STRIP-MCNC: NEGATIVE MG/DL
RBC # BLD AUTO: 4.14 M/UL (ref 4.7–6.1)
RBC #/AREA URNS AUTO: NORMAL /HPF (ref 0–5)
SODIUM SERPL-SCNC: 141 MEQ/L (ref 135–144)
SP GR UR STRIP: 1.01 (ref 1–1.03)
URINE REFLEX TO CULTURE: ABNORMAL
UROBILINOGEN UR STRIP-ACNC: 0.2 E.U./DL
WBC # BLD AUTO: 8.9 K/UL (ref 4.8–10.8)
WBC #/AREA URNS AUTO: NORMAL /HPF (ref 0–5)

## 2024-12-05 PROCEDURE — 86900 BLOOD TYPING SEROLOGIC ABO: CPT

## 2024-12-05 PROCEDURE — 80053 COMPREHEN METABOLIC PANEL: CPT

## 2024-12-05 PROCEDURE — 81001 URINALYSIS AUTO W/SCOPE: CPT

## 2024-12-05 PROCEDURE — 85025 COMPLETE CBC W/AUTO DIFF WBC: CPT

## 2024-12-05 PROCEDURE — 86850 RBC ANTIBODY SCREEN: CPT

## 2024-12-05 PROCEDURE — 86901 BLOOD TYPING SEROLOGIC RH(D): CPT

## 2024-12-05 PROCEDURE — 87641 MR-STAPH DNA AMP PROBE: CPT

## 2024-12-05 RX ORDER — TRAMADOL HYDROCHLORIDE 50 MG/1
50 TABLET ORAL EVERY 6 HOURS PRN
Status: ON HOLD | COMMUNITY
End: 2024-12-13 | Stop reason: HOSPADM

## 2024-12-08 LAB
MRSA, DNA, NASAL: NEGATIVE
SPECIMEN DESCRIPTION: NORMAL

## 2024-12-10 ENCOUNTER — OFFICE VISIT (OUTPATIENT)
Dept: ORTHOPEDIC SURGERY | Facility: CLINIC | Age: 64
End: 2024-12-10
Payer: COMMERCIAL

## 2024-12-10 VITALS — WEIGHT: 301 LBS | BODY MASS INDEX: 40.77 KG/M2 | HEIGHT: 72 IN

## 2024-12-10 DIAGNOSIS — M17.11 PRIMARY OSTEOARTHRITIS OF RIGHT KNEE: Primary | ICD-10-CM

## 2024-12-10 PROCEDURE — 99211 OFF/OP EST MAY X REQ PHY/QHP: CPT | Performed by: ORTHOPAEDIC SURGERY

## 2024-12-10 NOTE — PROGRESS NOTES
Subjective    Patient ID: Pedro    Chief Complaint:   Chief Complaint   Patient presents with    Right Knee - Pre-op Visit     TKA 12/12/2024     This patient has pain in the knee that is worsening.  The pain increases with activity and with weightbearing, and interferes with daily activities.  There is pain with range of motion, limited range of motion, crepitus and swelling.  The x-ray demonstrates joint space narrowing, osteophyte formation, and subchondral sclerosis.  The symptoms have worsened in spite of extensive medical treatment, therapy, and external support over at least the past 3 months.     This is the preop visit to discuss the risks and benefits of the total knee replacement surgery.  These risks were fully explained to the patient.  With this, and any surgery, infection is a risk, this is usually 1 to 2%, even higher in diabetics, persons with rheumatoid arthritis, previous surgeries, on oral steroid medications, and obesity.  All of these issues were properly addressed.  We always assure all sterile techniques will be followed.  Patient will also need to be on antibiotics for the next 2 years for any minor surgery, even dental work.  For high risk patients, this will be for lifetime.  Severe infections may require removal of the prosthesis.     It was also explained to the patient that there will be some minor blood loss during the procedure and a blood transfusion may be recommended if medically necessary.  It is also noted that with knee surgery a tourniquet is applied to the lower extremity to limit blood loss during the procedure.     Pulmonary embolism and blood clots are also discussed with the patient.  We discussed that these can be fatal complications to the surgery.  It is explained to the patient that the use of thromboembolic stockings, foot pumps, incentive spirometer's, early mobility, and the use of blood thinners for period of time is the standard of care for prevention of blood  clots.     Patient's preoperative range of motion is 5-110 degrees.  It is explained to the patient that this type of surgery is to decrease arthritis pain and sometimes stiffness may occur.  It is important that the patient go to physical therapy postoperatively.  It is also stated that occasionally we will have to manipulate the knee if pain and stiffness persists.     Loosening and wear of the prosthesis are also discussed.  The prosthesis normally last between 12 and 15 years.  Revisions may be more complicated and with higher risk.     Fractures, though rare, may also occur intraoperatively.  These fractures may be to the tibia or to the femur.  There may be nerve or arterial injuries as well and these are discussed in detail.     Lastly, the benefits of spinal anesthesia were explained to the patient.     All of the patient's questions and concerns were answered.     This note was prepared using voice recognition software.  The details of this note are correct and have been reviewed, and corrected to the best of my ability.  Some grammatical areas may persist related to the Dragon software     Hany Foreman PA-C     [phone]

## 2024-12-11 ENCOUNTER — ANESTHESIA EVENT (OUTPATIENT)
Dept: OPERATING ROOM | Age: 64
End: 2024-12-11
Payer: COMMERCIAL

## 2024-12-11 NOTE — DISCHARGE INSTRUCTIONS
Total Knee Replacement  Discharge Instructions    To prevent Clot formation, you have been placed on the following medication:  ASA for 30 days started on  Surgical Site Care:  .Change dressing once a day and PRN (as needed). Apply 4 x 4 sponge and light tape. If glue present, leave open to air.  You may leave wound open to air after initial dressing removal, if wound is clean, dry and intact  If Aquacel Ag dressing is present, do not remove dressing for 7 days, unless heavily saturated. If heavily saturated, remove dressing and start using instructions above  Staples will be removed on post-operative day 14 and steri-strips applied  Showering is permitted starting POD1 if waterproof Aquacel dressing is present or when the incision is covered with 4 x 4 and Tegaderm waterproof dressing  Until all areas of incision are healed.    Physical Therapy:  Weight Bearing Status:  WBAT  Per total knee protocol    Pain Medications  You were given narcotic and muscle relaxer  Wean off pain medications as you deem appropriate as long as pain is under control  Cold packs/Ice packs/Machine  May be used 3 times daily for 15-30 minutes as necessary  Be sure to have a barrier (cloth, clothing, towel) between the site and the ice pack to prevent frostbite  Contact [facility] office if  Increased redness, swelling, drainage of any kind, and/or pain to surgery site.  As well as new onset fevers and or chills.  These could signify an infection.  Calf or thigh tenderness to touch as well as increased swelling or redness.  This could signify a clot formation.  Numbness or tingling to an area around the incision site or below the incision site (toes).  Any rash appears, increased  or new onset nausea/vomiting occur.  This may indicate a reaction to a medication.  Phone # [phone].  Follow up with Surgeon, Dr Jimenez  I acknowledge that I have received amber hose and understand the instructions on how and when to wear them

## 2024-12-12 ENCOUNTER — APPOINTMENT (OUTPATIENT)
Dept: GENERAL RADIOLOGY | Age: 64
DRG: 470 | End: 2024-12-12
Attending: ORTHOPAEDIC SURGERY
Payer: COMMERCIAL

## 2024-12-12 ENCOUNTER — HOSPITAL ENCOUNTER (INPATIENT)
Age: 64
LOS: 1 days | Discharge: HOME HEALTH | DRG: 470 | End: 2024-12-17
Attending: ORTHOPAEDIC SURGERY | Admitting: ORTHOPAEDIC SURGERY
Payer: COMMERCIAL

## 2024-12-12 ENCOUNTER — ANESTHESIA (OUTPATIENT)
Dept: OPERATING ROOM | Age: 64
End: 2024-12-12
Payer: COMMERCIAL

## 2024-12-12 DIAGNOSIS — Z96.651 STATUS POST TOTAL KNEE REPLACEMENT, RIGHT: Primary | ICD-10-CM

## 2024-12-12 LAB
GLUCOSE BLD-MCNC: 119 MG/DL (ref 70–99)
GLUCOSE BLD-MCNC: 89 MG/DL (ref 70–99)
GLUCOSE BLD-MCNC: 91 MG/DL (ref 70–99)
GLUCOSE BLD-MCNC: 99 MG/DL (ref 70–99)
PERFORMED ON: ABNORMAL
PERFORMED ON: NORMAL

## 2024-12-12 PROCEDURE — 2580000003 HC RX 258: Performed by: ANESTHESIOLOGY

## 2024-12-12 PROCEDURE — 2709999900 HC NON-CHARGEABLE SUPPLY: Performed by: ORTHOPAEDIC SURGERY

## 2024-12-12 PROCEDURE — 27447 TOTAL KNEE ARTHROPLASTY: CPT | Performed by: PHYSICIAN ASSISTANT

## 2024-12-12 PROCEDURE — 2580000003 HC RX 258: Performed by: NURSE PRACTITIONER

## 2024-12-12 PROCEDURE — 3600000003 HC SURGERY LEVEL 3 BASE: Performed by: ORTHOPAEDIC SURGERY

## 2024-12-12 PROCEDURE — 6360000002 HC RX W HCPCS: Performed by: NURSE PRACTITIONER

## 2024-12-12 PROCEDURE — 6360000002 HC RX W HCPCS: Performed by: ORTHOPAEDIC SURGERY

## 2024-12-12 PROCEDURE — 0SRC069 REPLACEMENT OF RIGHT KNEE JOINT WITH OXIDIZED ZIRCONIUM ON POLYETHYLENE SYNTHETIC SUBSTITUTE, CEMENTED, OPEN APPROACH: ICD-10-PCS | Performed by: ORTHOPAEDIC SURGERY

## 2024-12-12 PROCEDURE — 6360000002 HC RX W HCPCS: Performed by: ANESTHESIOLOGY

## 2024-12-12 PROCEDURE — C1776 JOINT DEVICE (IMPLANTABLE): HCPCS | Performed by: ORTHOPAEDIC SURGERY

## 2024-12-12 PROCEDURE — G0378 HOSPITAL OBSERVATION PER HR: HCPCS

## 2024-12-12 PROCEDURE — 2580000003 HC RX 258: Performed by: ORTHOPAEDIC SURGERY

## 2024-12-12 PROCEDURE — 27447 TOTAL KNEE ARTHROPLASTY: CPT | Performed by: ORTHOPAEDIC SURGERY

## 2024-12-12 PROCEDURE — 64447 NJX AA&/STRD FEMORAL NRV IMG: CPT | Performed by: ANESTHESIOLOGY

## 2024-12-12 PROCEDURE — 6370000000 HC RX 637 (ALT 250 FOR IP): Performed by: FAMILY MEDICINE

## 2024-12-12 PROCEDURE — 2500000003 HC RX 250 WO HCPCS: Performed by: NURSE ANESTHETIST, CERTIFIED REGISTERED

## 2024-12-12 PROCEDURE — 97162 PT EVAL MOD COMPLEX 30 MIN: CPT

## 2024-12-12 PROCEDURE — 3700000001 HC ADD 15 MINUTES (ANESTHESIA): Performed by: ORTHOPAEDIC SURGERY

## 2024-12-12 PROCEDURE — 6370000000 HC RX 637 (ALT 250 FOR IP): Performed by: NURSE PRACTITIONER

## 2024-12-12 PROCEDURE — 97166 OT EVAL MOD COMPLEX 45 MIN: CPT

## 2024-12-12 PROCEDURE — 6360000002 HC RX W HCPCS: Performed by: NURSE ANESTHETIST, CERTIFIED REGISTERED

## 2024-12-12 PROCEDURE — 7100000000 HC PACU RECOVERY - FIRST 15 MIN: Performed by: ORTHOPAEDIC SURGERY

## 2024-12-12 PROCEDURE — 7100000001 HC PACU RECOVERY - ADDTL 15 MIN: Performed by: ORTHOPAEDIC SURGERY

## 2024-12-12 PROCEDURE — 73560 X-RAY EXAM OF KNEE 1 OR 2: CPT

## 2024-12-12 PROCEDURE — 3700000000 HC ANESTHESIA ATTENDED CARE: Performed by: ORTHOPAEDIC SURGERY

## 2024-12-12 PROCEDURE — 3600000013 HC SURGERY LEVEL 3 ADDTL 15MIN: Performed by: ORTHOPAEDIC SURGERY

## 2024-12-12 PROCEDURE — 94150 VITAL CAPACITY TEST: CPT

## 2024-12-12 DEVICE — CRUCIATE RETAINING FEMORAL
Type: IMPLANTABLE DEVICE | Site: KNEE | Status: FUNCTIONAL
Brand: TRIATHLON

## 2024-12-12 DEVICE — PATELLA
Type: IMPLANTABLE DEVICE | Site: KNEE | Status: FUNCTIONAL
Brand: TRIATHLON

## 2024-12-12 DEVICE — TIBIAL BEARING INSERT - CS
Type: IMPLANTABLE DEVICE | Site: KNEE | Status: FUNCTIONAL
Brand: TRIATHLON

## 2024-12-12 DEVICE — SIMPLEX® HV IS A FAST-SETTING ACRYLIC RESIN FOR USE IN BONE SURGERY. MIXING THE TWO SEPARATE STERILE COMPONENTS PRODUCES A DUCTILE BONE CEMENT WHICH, AFTER HARDENING, FIXES THE IMPLANT AND TRANSFERS STRESSES PRODUCED DURING MOVEMENT EVENLY TO THE BONE. SIMPLEX® HV CEMENT POWDER ALSO CONTAINS INSOLUBLE ZIRCONIUM DIOXIDE AS AN X-RAY CONTRAST MEDIUM. SIMPLEX® HV DOES NOT EMIT A SIGNAL AND DOES NOT POSE A SAFETY RISK IN A MAGNETIC RESONANCE ENVIRONMENT.
Type: IMPLANTABLE DEVICE | Site: KNEE | Status: FUNCTIONAL
Brand: SIMPLEX HV

## 2024-12-12 DEVICE — PRIMARY TIBIAL BASEPLATE
Type: IMPLANTABLE DEVICE | Site: KNEE | Status: FUNCTIONAL
Brand: TRIATHLON

## 2024-12-12 RX ORDER — SENNA AND DOCUSATE SODIUM 50; 8.6 MG/1; MG/1
1 TABLET, FILM COATED ORAL 2 TIMES DAILY
Status: DISCONTINUED | OUTPATIENT
Start: 2024-12-12 | End: 2024-12-17 | Stop reason: HOSPADM

## 2024-12-12 RX ORDER — PANTOPRAZOLE SODIUM 40 MG/1
40 TABLET, DELAYED RELEASE ORAL
Status: DISCONTINUED | OUTPATIENT
Start: 2024-12-13 | End: 2024-12-17 | Stop reason: HOSPADM

## 2024-12-12 RX ORDER — ACETAMINOPHEN 500 MG
1000 TABLET ORAL ONCE
Status: COMPLETED | OUTPATIENT
Start: 2024-12-12 | End: 2024-12-12

## 2024-12-12 RX ORDER — EPHEDRINE SULFATE/0.9% NACL/PF 25 MG/5 ML
SYRINGE (ML) INTRAVENOUS
Status: DISCONTINUED | OUTPATIENT
Start: 2024-12-12 | End: 2024-12-12 | Stop reason: SDUPTHER

## 2024-12-12 RX ORDER — SODIUM CHLORIDE 0.9 % (FLUSH) 0.9 %
5-40 SYRINGE (ML) INJECTION EVERY 12 HOURS SCHEDULED
Status: DISCONTINUED | OUTPATIENT
Start: 2024-12-12 | End: 2024-12-12 | Stop reason: HOSPADM

## 2024-12-12 RX ORDER — SODIUM CHLORIDE 0.9 % (FLUSH) 0.9 %
5-40 SYRINGE (ML) INJECTION PRN
Status: DISCONTINUED | OUTPATIENT
Start: 2024-12-12 | End: 2024-12-12 | Stop reason: HOSPADM

## 2024-12-12 RX ORDER — CELECOXIB 200 MG/1
200 CAPSULE ORAL ONCE
Status: COMPLETED | OUTPATIENT
Start: 2024-12-12 | End: 2024-12-12

## 2024-12-12 RX ORDER — DEXTROSE MONOHYDRATE 100 MG/ML
INJECTION, SOLUTION INTRAVENOUS CONTINUOUS PRN
Status: DISCONTINUED | OUTPATIENT
Start: 2024-12-12 | End: 2024-12-17 | Stop reason: HOSPADM

## 2024-12-12 RX ORDER — SODIUM CHLORIDE 9 MG/ML
INJECTION, SOLUTION INTRAVENOUS PRN
Status: DISCONTINUED | OUTPATIENT
Start: 2024-12-12 | End: 2024-12-12 | Stop reason: HOSPADM

## 2024-12-12 RX ORDER — ATORVASTATIN CALCIUM 40 MG/1
40 TABLET, FILM COATED ORAL DAILY
Status: DISCONTINUED | OUTPATIENT
Start: 2024-12-12 | End: 2024-12-17 | Stop reason: HOSPADM

## 2024-12-12 RX ORDER — SODIUM CHLORIDE 9 MG/ML
INJECTION, SOLUTION INTRAVENOUS PRN
Status: DISCONTINUED | OUTPATIENT
Start: 2024-12-12 | End: 2024-12-17 | Stop reason: HOSPADM

## 2024-12-12 RX ORDER — TRANEXAMIC ACID 650 MG/1
1950 TABLET ORAL
Status: DISCONTINUED | OUTPATIENT
Start: 2024-12-12 | End: 2024-12-12 | Stop reason: HOSPADM

## 2024-12-12 RX ORDER — PROPOFOL 10 MG/ML
INJECTION, EMULSION INTRAVENOUS
Status: DISCONTINUED | OUTPATIENT
Start: 2024-12-12 | End: 2024-12-12 | Stop reason: SDUPTHER

## 2024-12-12 RX ORDER — LISINOPRIL 20 MG/1
40 TABLET ORAL DAILY
Status: DISCONTINUED | OUTPATIENT
Start: 2024-12-12 | End: 2024-12-17 | Stop reason: HOSPADM

## 2024-12-12 RX ORDER — ROPIVACAINE HYDROCHLORIDE 5 MG/ML
INJECTION, SOLUTION EPIDURAL; INFILTRATION; PERINEURAL
Status: DISCONTINUED | OUTPATIENT
Start: 2024-12-12 | End: 2024-12-12 | Stop reason: SDUPTHER

## 2024-12-12 RX ORDER — BUPIVACAINE HYDROCHLORIDE 7.5 MG/ML
INJECTION, SOLUTION INTRASPINAL
Status: COMPLETED | OUTPATIENT
Start: 2024-12-12 | End: 2024-12-12

## 2024-12-12 RX ORDER — ONDANSETRON 4 MG/1
4 TABLET, ORALLY DISINTEGRATING ORAL EVERY 8 HOURS PRN
Status: DISCONTINUED | OUTPATIENT
Start: 2024-12-12 | End: 2024-12-17 | Stop reason: HOSPADM

## 2024-12-12 RX ORDER — ONDANSETRON 2 MG/ML
4 INJECTION INTRAMUSCULAR; INTRAVENOUS
Status: DISCONTINUED | OUTPATIENT
Start: 2024-12-12 | End: 2024-12-12 | Stop reason: HOSPADM

## 2024-12-12 RX ORDER — CYCLOBENZAPRINE HCL 10 MG
10 TABLET ORAL 3 TIMES DAILY PRN
Status: DISCONTINUED | OUTPATIENT
Start: 2024-12-12 | End: 2024-12-17 | Stop reason: HOSPADM

## 2024-12-12 RX ORDER — SODIUM CHLORIDE 0.9 % (FLUSH) 0.9 %
5-40 SYRINGE (ML) INJECTION EVERY 12 HOURS SCHEDULED
Status: DISCONTINUED | OUTPATIENT
Start: 2024-12-12 | End: 2024-12-17 | Stop reason: HOSPADM

## 2024-12-12 RX ORDER — OXYCODONE HCL 10 MG/1
10 TABLET, FILM COATED, EXTENDED RELEASE ORAL ONCE
Status: COMPLETED | OUTPATIENT
Start: 2024-12-12 | End: 2024-12-12

## 2024-12-12 RX ORDER — MIDAZOLAM HYDROCHLORIDE 1 MG/ML
INJECTION, SOLUTION INTRAMUSCULAR; INTRAVENOUS
Status: DISCONTINUED | OUTPATIENT
Start: 2024-12-12 | End: 2024-12-12 | Stop reason: SDUPTHER

## 2024-12-12 RX ORDER — SPIRONOLACTONE 50 MG/1
50 TABLET, FILM COATED ORAL DAILY
Status: DISCONTINUED | OUTPATIENT
Start: 2024-12-12 | End: 2024-12-17 | Stop reason: HOSPADM

## 2024-12-12 RX ORDER — GLUCAGON 1 MG/ML
1 KIT INJECTION PRN
Status: DISCONTINUED | OUTPATIENT
Start: 2024-12-12 | End: 2024-12-17 | Stop reason: HOSPADM

## 2024-12-12 RX ORDER — MAGNESIUM HYDROXIDE/ALUMINUM HYDROXICE/SIMETHICONE 120; 1200; 1200 MG/30ML; MG/30ML; MG/30ML
15 SUSPENSION ORAL EVERY 6 HOURS PRN
Status: DISCONTINUED | OUTPATIENT
Start: 2024-12-12 | End: 2024-12-17 | Stop reason: HOSPADM

## 2024-12-12 RX ORDER — INSULIN LISPRO 100 [IU]/ML
0-8 INJECTION, SOLUTION INTRAVENOUS; SUBCUTANEOUS
Status: DISCONTINUED | OUTPATIENT
Start: 2024-12-12 | End: 2024-12-17 | Stop reason: HOSPADM

## 2024-12-12 RX ORDER — DIPHENHYDRAMINE HYDROCHLORIDE 50 MG/ML
12.5 INJECTION INTRAMUSCULAR; INTRAVENOUS
Status: DISCONTINUED | OUTPATIENT
Start: 2024-12-12 | End: 2024-12-12 | Stop reason: HOSPADM

## 2024-12-12 RX ORDER — FENTANYL CITRATE 0.05 MG/ML
50 INJECTION, SOLUTION INTRAMUSCULAR; INTRAVENOUS EVERY 10 MIN PRN
Status: DISCONTINUED | OUTPATIENT
Start: 2024-12-12 | End: 2024-12-12 | Stop reason: HOSPADM

## 2024-12-12 RX ORDER — NALOXONE HYDROCHLORIDE 0.4 MG/ML
INJECTION, SOLUTION INTRAMUSCULAR; INTRAVENOUS; SUBCUTANEOUS PRN
Status: DISCONTINUED | OUTPATIENT
Start: 2024-12-12 | End: 2024-12-12 | Stop reason: HOSPADM

## 2024-12-12 RX ORDER — SODIUM CHLORIDE 0.9 % (FLUSH) 0.9 %
5-40 SYRINGE (ML) INJECTION PRN
Status: DISCONTINUED | OUTPATIENT
Start: 2024-12-12 | End: 2024-12-17 | Stop reason: HOSPADM

## 2024-12-12 RX ORDER — ONDANSETRON 2 MG/ML
4 INJECTION INTRAMUSCULAR; INTRAVENOUS EVERY 6 HOURS PRN
Status: DISCONTINUED | OUTPATIENT
Start: 2024-12-12 | End: 2024-12-17 | Stop reason: HOSPADM

## 2024-12-12 RX ORDER — OXYCODONE HYDROCHLORIDE 5 MG/1
5 TABLET ORAL
Status: DISCONTINUED | OUTPATIENT
Start: 2024-12-12 | End: 2024-12-12 | Stop reason: HOSPADM

## 2024-12-12 RX ORDER — ACETAMINOPHEN 325 MG/1
650 TABLET ORAL EVERY 6 HOURS
Status: DISCONTINUED | OUTPATIENT
Start: 2024-12-12 | End: 2024-12-14

## 2024-12-12 RX ORDER — SODIUM CHLORIDE 9 MG/ML
INJECTION, SOLUTION INTRAVENOUS CONTINUOUS
Status: DISCONTINUED | OUTPATIENT
Start: 2024-12-12 | End: 2024-12-16

## 2024-12-12 RX ORDER — METOCLOPRAMIDE HYDROCHLORIDE 5 MG/ML
10 INJECTION INTRAMUSCULAR; INTRAVENOUS
Status: DISCONTINUED | OUTPATIENT
Start: 2024-12-12 | End: 2024-12-12 | Stop reason: HOSPADM

## 2024-12-12 RX ORDER — OXYCODONE HYDROCHLORIDE 5 MG/1
2.5 TABLET ORAL EVERY 4 HOURS PRN
Status: DISCONTINUED | OUTPATIENT
Start: 2024-12-12 | End: 2024-12-13

## 2024-12-12 RX ORDER — TRANEXAMIC ACID 650 MG/1
1950 TABLET ORAL ONCE
Status: COMPLETED | OUTPATIENT
Start: 2024-12-13 | End: 2024-12-13

## 2024-12-12 RX ORDER — KETOROLAC TROMETHAMINE 15 MG/ML
7.5 INJECTION, SOLUTION INTRAMUSCULAR; INTRAVENOUS EVERY 6 HOURS
Status: COMPLETED | OUTPATIENT
Start: 2024-12-12 | End: 2024-12-13

## 2024-12-12 RX ORDER — ASPIRIN 81 MG/1
81 TABLET ORAL 2 TIMES DAILY
Status: DISCONTINUED | OUTPATIENT
Start: 2024-12-12 | End: 2024-12-17 | Stop reason: HOSPADM

## 2024-12-12 RX ORDER — MEPERIDINE HYDROCHLORIDE 25 MG/ML
12.5 INJECTION INTRAMUSCULAR; INTRAVENOUS; SUBCUTANEOUS
Status: DISCONTINUED | OUTPATIENT
Start: 2024-12-12 | End: 2024-12-12 | Stop reason: HOSPADM

## 2024-12-12 RX ORDER — HYDROXYZINE HYDROCHLORIDE 10 MG/1
10 TABLET, FILM COATED ORAL 3 TIMES DAILY
Status: DISCONTINUED | OUTPATIENT
Start: 2024-12-12 | End: 2024-12-17 | Stop reason: HOSPADM

## 2024-12-12 RX ORDER — OXYCODONE HYDROCHLORIDE 5 MG/1
5 TABLET ORAL EVERY 4 HOURS PRN
Status: DISCONTINUED | OUTPATIENT
Start: 2024-12-12 | End: 2024-12-13

## 2024-12-12 RX ADMIN — ROPIVACAINE HYDROCHLORIDE 30 ML: 5 INJECTION, SOLUTION EPIDURAL; INFILTRATION; PERINEURAL at 08:30

## 2024-12-12 RX ADMIN — SPIRONOLACTONE 50 MG: 50 TABLET ORAL at 17:25

## 2024-12-12 RX ADMIN — SODIUM CHLORIDE: 9 INJECTION, SOLUTION INTRAVENOUS at 12:02

## 2024-12-12 RX ADMIN — CELECOXIB 200 MG: 200 CAPSULE ORAL at 07:41

## 2024-12-12 RX ADMIN — OXYCODONE 5 MG: 5 TABLET ORAL at 20:46

## 2024-12-12 RX ADMIN — ASPIRIN 81 MG: 81 TABLET, COATED ORAL at 11:58

## 2024-12-12 RX ADMIN — SENNOSIDES AND DOCUSATE SODIUM 1 TABLET: 50; 8.6 TABLET ORAL at 11:58

## 2024-12-12 RX ADMIN — HYDROXYZINE HYDROCHLORIDE 10 MG: 10 TABLET, FILM COATED ORAL at 13:37

## 2024-12-12 RX ADMIN — WATER 3000 MG: 1 INJECTION INTRAMUSCULAR; INTRAVENOUS; SUBCUTANEOUS at 15:51

## 2024-12-12 RX ADMIN — EPHEDRINE SULFATE 10 MG: 5 INJECTION INTRAVENOUS at 09:46

## 2024-12-12 RX ADMIN — TRANEXAMIC ACID 1950 MG: 650 TABLET ORAL at 07:41

## 2024-12-12 RX ADMIN — ACETAMINOPHEN 1000 MG: 500 TABLET ORAL at 07:41

## 2024-12-12 RX ADMIN — SODIUM CHLORIDE: 9 INJECTION, SOLUTION INTRAVENOUS at 22:10

## 2024-12-12 RX ADMIN — WATER 3000 MG: 1 INJECTION INTRAMUSCULAR; INTRAVENOUS; SUBCUTANEOUS at 08:49

## 2024-12-12 RX ADMIN — ACETAMINOPHEN 650 MG: 325 TABLET ORAL at 17:21

## 2024-12-12 RX ADMIN — SODIUM CHLORIDE: 9 INJECTION, SOLUTION INTRAVENOUS at 08:20

## 2024-12-12 RX ADMIN — SODIUM CHLORIDE, PRESERVATIVE FREE 10 ML: 5 INJECTION INTRAVENOUS at 20:48

## 2024-12-12 RX ADMIN — PHENYLEPHRINE HYDROCHLORIDE 100 MCG: 10 INJECTION INTRAVENOUS at 09:27

## 2024-12-12 RX ADMIN — KETOROLAC TROMETHAMINE 7.5 MG: 15 INJECTION, SOLUTION INTRAMUSCULAR; INTRAVENOUS at 11:58

## 2024-12-12 RX ADMIN — BUPIVACAINE HYDROCHLORIDE IN DEXTROSE 15 MG: 7.5 INJECTION, SOLUTION SUBARACHNOID at 08:40

## 2024-12-12 RX ADMIN — OXYCODONE HYDROCHLORIDE 10 MG: 10 TABLET, FILM COATED, EXTENDED RELEASE ORAL at 07:41

## 2024-12-12 RX ADMIN — MIDAZOLAM HYDROCHLORIDE 2 MG: 1 INJECTION, SOLUTION INTRAMUSCULAR; INTRAVENOUS at 08:30

## 2024-12-12 RX ADMIN — EPHEDRINE SULFATE 10 MG: 5 INJECTION INTRAVENOUS at 09:31

## 2024-12-12 RX ADMIN — PROPOFOL 150 MCG/KG/MIN: 10 INJECTION, EMULSION INTRAVENOUS at 08:48

## 2024-12-12 RX ADMIN — HYDROXYZINE HYDROCHLORIDE 10 MG: 10 TABLET, FILM COATED ORAL at 20:45

## 2024-12-12 RX ADMIN — KETOROLAC TROMETHAMINE 7.5 MG: 15 INJECTION, SOLUTION INTRAMUSCULAR; INTRAVENOUS at 17:21

## 2024-12-12 RX ADMIN — EPHEDRINE SULFATE 5 MG: 5 INJECTION INTRAVENOUS at 09:56

## 2024-12-12 RX ADMIN — OXYCODONE 5 MG: 5 TABLET ORAL at 16:30

## 2024-12-12 RX ADMIN — LISINOPRIL 40 MG: 20 TABLET ORAL at 17:25

## 2024-12-12 RX ADMIN — PHENYLEPHRINE HYDROCHLORIDE 100 MCG: 10 INJECTION INTRAVENOUS at 09:28

## 2024-12-12 RX ADMIN — Medication 10 ML: at 20:52

## 2024-12-12 RX ADMIN — ASPIRIN 81 MG: 81 TABLET, COATED ORAL at 20:45

## 2024-12-12 RX ADMIN — ATORVASTATIN CALCIUM 40 MG: 40 TABLET, FILM COATED ORAL at 20:51

## 2024-12-12 RX ADMIN — ACETAMINOPHEN 650 MG: 325 TABLET ORAL at 11:58

## 2024-12-12 RX ADMIN — CYCLOBENZAPRINE 10 MG: 10 TABLET, FILM COATED ORAL at 17:21

## 2024-12-12 ASSESSMENT — PAIN DESCRIPTION - ORIENTATION
ORIENTATION: LEFT
ORIENTATION: RIGHT
ORIENTATION: LEFT
ORIENTATION: LEFT

## 2024-12-12 ASSESSMENT — PAIN SCALES - GENERAL
PAINLEVEL_OUTOF10: 9
PAINLEVEL_OUTOF10: 0
PAINLEVEL_OUTOF10: 10
PAINLEVEL_OUTOF10: 10
PAINLEVEL_OUTOF10: 5

## 2024-12-12 ASSESSMENT — PAIN DESCRIPTION - LOCATION
LOCATION: KNEE

## 2024-12-12 ASSESSMENT — PAIN DESCRIPTION - DESCRIPTORS
DESCRIPTORS: ACHING
DESCRIPTORS: ACHING;SPASM
DESCRIPTORS: THROBBING
DESCRIPTORS: ACHING

## 2024-12-12 NOTE — CONSULTS
Hospitalist Consult    Admit Date: 12/12/2024  PCP: Leela Ron MD    CHIEF COMPLAINT:  right knee pain    HISTORY OF PRESENT ILLNESS:    The patient is a 64 y.o. male presents with a history of diabetes mellitus type 2, hypertension, hyperlipidemia, osteoarthritis who presents for a planned right total knee arthroplasty.  He is postop day 0.  He had failed outpatient conservative treatment.  He is resting in bed with pain moderately well-controlled.    Past Medical History:        Diagnosis Date    Diabetes mellitus (HCC)     Hyperlipidemia     Hypertension      Past Surgical History:        Procedure Laterality Date    COLONOSCOPY  06/2022    KNEE ARTHROSCOPY Right     PILONIDAL CYST EXCISION       Social History:   Social History     Socioeconomic History    Marital status:      Spouse name: Not on file    Number of children: Not on file    Years of education: Not on file    Highest education level: Not on file   Occupational History    Not on file   Tobacco Use    Smoking status: Former     Types: Cigarettes    Smokeless tobacco: Never   Substance and Sexual Activity    Alcohol use: Yes    Drug use: Never    Sexual activity: Not on file   Other Topics Concern    Not on file   Social History Narrative    Not on file     Social Determinants of Health     Financial Resource Strain: Not on file   Food Insecurity: No Food Insecurity (12/12/2024)    Hunger Vital Sign     Worried About Running Out of Food in the Last Year: Never true     Ran Out of Food in the Last Year: Never true   Transportation Needs: No Transportation Needs (12/12/2024)    PRAPARE - Transportation     Lack of Transportation (Medical): No     Lack of Transportation (Non-Medical): No   Physical Activity: Not on file   Stress: Not on file   Social Connections: Not on file   Intimate Partner Violence: Not on file   Housing Stability: Low Risk  (12/12/2024)    Housing Stability Vital Sign     Unable to Pay for Housing in the Last Year:

## 2024-12-12 NOTE — ANESTHESIA PROCEDURE NOTES
Spinal Block    Patient location during procedure: pre-op  Reason for block: primary anesthetic  Staffing  Performed: anesthesiologist   Anesthesiologist: Rickey Wesley MD  Performed by: Rickey Wesley MD  Authorized by: Rickey Wesley MD    Spinal Block  Patient position: sitting  Prep: Betadine  Patient monitoring: cardiac monitor, continuous pulse ox and frequent blood pressure checks  Approach: midline  Location: L4/L5  Provider prep: mask and sterile gloves  Local infiltration: lidocaine  Needle  Needle type: Pencan   Needle gauge: 24 G  Needle length: 3.5 in  Assessment  Sensory level: T6  Events: cerebrospinal fluid  Lysis level: CSF aspirated.  CSF: clear  Attempts: 1  Hemodynamics: stable  Additional Notes  Free flowing CSF.  Negative heme.  Negative paresthesia.  .  Preanesthetic Checklist  Completed: patient identified, IV checked, site marked, risks and benefits discussed, surgical/procedural consents, equipment checked, pre-op evaluation, timeout performed, anesthesia consent given, oxygen available and monitors applied/VS acknowledged

## 2024-12-12 NOTE — PLAN OF CARE
See OT evaluation for all goals and OT POC. Electronically signed by Shellie Pressley OTR/L on 12/12/2024 at 3:44 PM

## 2024-12-12 NOTE — ANESTHESIA PRE PROCEDURE
Oral 90 Min Pre-Op Gill Talley, APRN - CNP   1,950 mg at 12/12/24 0741   • ceFAZolin Sodium (ANCEF) 3,000 mg in sterile water 20 mL IV syringe  3,000 mg IntraVENous On Call to OR Rhys Lee MD       • 0.9 % sodium chloride infusion   IntraVENous Continuous Rickey Wesley MD           Allergies:  No Known Allergies    Problem List:  There is no problem list on file for this patient.      Past Medical History:        Diagnosis Date   • Diabetes mellitus (HCC)    • Hyperlipidemia    • Hypertension        Past Surgical History:        Procedure Laterality Date   • COLONOSCOPY  06/2022   • KNEE ARTHROSCOPY Right    • PILONIDAL CYST EXCISION         Social History:    Social History     Tobacco Use   • Smoking status: Former     Types: Cigarettes   • Smokeless tobacco: Never   Substance Use Topics   • Alcohol use: Yes                                Counseling given: Not Answered      Vital Signs (Current): There were no vitals filed for this visit.                                           BP Readings from Last 3 Encounters:   12/05/24 102/62   10/03/24 (!) 142/80       NPO Status: Time of last liquid consumption: 0300                        Time of last solid consumption: 2000                        Date of last liquid consumption: 12/12/24                        Date of last solid food consumption: 12/11/24    BMI:   Wt Readings from Last 3 Encounters:   12/05/24 (!) 136.6 kg (301 lb 3.2 oz)   10/03/24 (!) 139.3 kg (307 lb)     There is no height or weight on file to calculate BMI.    CBC:   Lab Results   Component Value Date/Time    WBC 8.9 12/05/2024 10:17 AM    RBC 4.14 12/05/2024 10:17 AM    HGB 13.2 12/05/2024 10:17 AM    HCT 39.1 12/05/2024 10:17 AM    MCV 94.4 12/05/2024 10:17 AM    RDW 13.2 12/05/2024 10:17 AM     12/05/2024 10:17 AM       CMP:   Lab Results   Component Value Date/Time     12/05/2024 10:17 AM    K 3.5 12/05/2024 10:17 AM     12/05/2024 10:17 AM    CO2

## 2024-12-12 NOTE — DISCHARGE INSTR - COC
Continuity of Care Form    Patient Name: Sukumar Clarke   :  1960  MRN:  [id number]    Admit date:  2024  Discharge date:  24    Code Status Order: No Order   Advance Directives:   Advance Care Flowsheet Documentation        Date/Time Healthcare Directive Type of Healthcare Directive Copy in Chart Healthcare Agent Appointed Healthcare Agent's Name Healthcare Agent's Phone Number    24 0725 No, patient does not have an advance directive for healthcare treatment  --  --  --  --  --                     Admitting Physician:  Rhys Lee MD  PCP: Leela Ron MD    Discharging Nurse: valorie  Discharging Hospital Unit/Room#: [facility] OR Pool/NONE  Discharging Unit Phone Number: [phone]    Emergency Contact:   Extended Emergency Contact Information  Primary Emergency Contact: Maegan Marrero  [place] Phone: [phone]  Relation: Other Relative    Past Surgical History:  Past Surgical History:   Procedure Laterality Date    COLONOSCOPY  2022    KNEE ARTHROSCOPY Right     PILONIDAL CYST EXCISION         Immunization History:   Immunization History   Administered Date(s) Administered    COVID-19, PFIZER PURPLE top, DILUTE for use, (age 12 y+), 30mcg/0.3mL 2021, 2021       Active Problems:  There is no problem list on file for this patient.      Isolation/Infection:   Isolation            No Isolation          Patient Infection Status       None to display            Nurse Assessment:  Last Vital Signs: There were no vitals taken for this visit.    Last documented pain score (0-10 scale):    Last Weight:   Wt Readings from Last 1 Encounters:   24 (!) 136.6 kg (301 lb 3.2 oz)     Mental Status:  alert, coherent, logical, and thought processes intact    IV Access:  - None    Nursing Mobility/ADLs:  Walking   Independent  Transfer  Independent  Bathing  Independent  Dressing  Independent  Toileting  Independent  Feeding  Independent  Med Admin  Independent  Med Delivery

## 2024-12-12 NOTE — ANESTHESIA PROCEDURE NOTES
Peripheral Block    Patient location during procedure: pre-op  Reason for block: post-op pain management and at surgeon's request  Start time: 12/12/2024 8:30 AM  Staffing  Performed: anesthesiologist   Anesthesiologist: Rickey Wesley MD  Performed by: Rickey Wesley MD  Authorized by: Rickey Wesley MD    Preanesthetic Checklist  Completed: patient identified, IV checked, site marked, risks and benefits discussed, surgical/procedural consents, equipment checked, pre-op evaluation, timeout performed, anesthesia consent given, oxygen available and monitors applied/VS acknowledged  Peripheral Block   Patient position: supine  Prep: ChloraPrep  Provider prep: mask and sterile gloves (Sterile probe cover)  Patient monitoring: cardiac monitor, continuous pulse ox, frequent blood pressure checks and IV access  Block type: Saphenous  Laterality: right  Injection technique: single-shot  Guidance: ultrasound guided  Local infiltration: ropivacaine  Infiltration strength: 0.5 %  Local infiltration: ropivacaine  Dose: 30 mL    Needle   Needle type: combined needle/nerve stimulator   Needle gauge: 22 G  Needle localization: anatomical landmarks and ultrasound guidance  Test dose: negative  Needle length: 5 cm  Assessment   Injection assessment: negative aspiration for heme, no paresthesia on injection and local visualized surrounding nerve on ultrasound  Paresthesia pain: immediately resolved  Slow fractionated injection: yes  Hemodynamics: stable  Outcomes: uncomplicated and patient tolerated procedure well    Additional Notes  Ultrasound guidance used to view needle for placement.    Ultrasound image stored,  printed and saved in patient chart.    Sterile probe cover used

## 2024-12-12 NOTE — ANESTHESIA POSTPROCEDURE EVALUATION
Department of Anesthesiology  Postprocedure Note    Patient: Sukumar Clarke  MRN: [id number]  YOB: 1960  Date of evaluation: 12/12/2024    Procedure Summary       Date: 12/12/24 Room / Location: [address]    Anesthesia Start: 0843 Anesthesia Stop: 1021    Procedure: RIGHT KNEE TOTAL KNEE REPLACEMENT (Right: Knee) Diagnosis:       Primary osteoarthritis of right knee      (Primary osteoarthritis of right knee [M17.11])    Surgeons: Rhys Lee MD Responsible Provider: Rickey Wesley MD    Anesthesia Type: spinal ASA Status: 3            Anesthesia Type: No value filed.    Bhakti Phase I: Bhakti Score: 10    Bhakti Phase II:      Anesthesia Post Evaluation    Patient location during evaluation: bedside  Patient participation: complete - patient participated  Level of consciousness: awake and alert  Pain score: 0  Airway patency: patent  Nausea & Vomiting: no nausea and no vomiting  Cardiovascular status: blood pressure returned to baseline and hemodynamically stable  Respiratory status: acceptable, spontaneous ventilation, nonlabored ventilation and face mask  Hydration status: euvolemic  Pain management: adequate        No notable events documented.

## 2024-12-12 NOTE — H&P
History and physical is reviewed, patient re evaluated, no changes.  Procedure, risks, benefits, and postoperative course were discussed with the patient and consent is reviewed.  Rhys Lee MD

## 2024-12-12 NOTE — CARE COORDINATION
Case Management Assessment  Initial Evaluation    Date/Time of Evaluation: 12/12/2024 11:49 AM  Assessment Completed by: Maegan Aguiar RN    If patient is discharged prior to next notation, then this note serves as note for discharge by case management.    Patient Name: Sukumar Clarke                   YOB: 1960  Diagnosis: Primary osteoarthritis of right knee [M17.11]  Status post total knee replacement, right [Z96.651]                   Date / Time: 12/12/2024  6:54 AM    Patient Admission Status: Observation   Readmission Risk (Low < 19, Mod (19-27), High > 27): No data recorded  Current PCP: Leela Ron MD  PCP verified by CM? Yes    Chart Reviewed: Yes      History Provided by: Patient  Patient Orientation: Alert and Oriented, Person, Place, Situation, Self    Patient Cognition: Alert    Hospitalization in the last 30 days (Readmission):  No    If yes, Readmission Assessment in  Navigator will be completed.    Advance Directives:      Code Status: Full Code   Patient's Primary Decision Maker is: Legal Next of Kin      Discharge Planning:    Patient lives with: Alone Type of Home: Apartment  Primary Care Giver: Self  Patient Support Systems include: Family Members   Current Financial resources:    Current community resources:    Current services prior to admission: C-pap            Current DME:              Type of Home Care services:  OT, PT    ADLS  Prior functional level: Independent in ADLs/IADLs  Current functional level: Independent in ADLs/IADLs    PT AM-PAC:   /24  OT AM-PAC:   /24    Family can provide assistance at DC: No  Would you like Case Management to discuss the discharge plan with any other family members/significant others, and if so, who? No  Plans to Return to Present Housing: Yes  Other Identified Issues/Barriers to RETURNING to current housing: no  Potential Assistance needed at discharge: Outpatient PT/OT            Potential DME:    Patient expects to discharge

## 2024-12-12 NOTE — OP NOTE
made in 5 degrees of valgus 8 mm resection.  Once the distal femoral cut was performed the rotational guide was applied.  Peg holes were drilled for the rotation.  And femoral sizing was performed.  The multipurpose cutting block was then pinned in position and anterior posterior chamfer cuts were completed.  All bony surfaces were cleaned up with a rongeur.    At this point of tibial resection was performed.  Medial and lateral collateral retractors were inserted.  Posterior retractor was also inserted.  Meniscal remnants were sharply excised.  Using the extra medullary alignment device the tibial resection was made 2 mm below the most deficient tibial bone surface.  The bony resection was then removed without difficulty.  Trial tibia was then pinned in position.  Cruciate punch was then inserted without difficulty . we then applied the trial polyethylene insert and the femoral trial was inserted.  The knee was then placed in full extension and through anatomic range of motion and stability was assessed.  Once the stability was confirmed the patellar resection was then performed.  Patellar reamer was performed and peg holes were drilled.  Patella trial was applied and patellofemoral tracking was assessed.  All trial components were then removed.  All bony surfaces were irrigated with copious amount of pulsatile irrigation.  Final implants were then inserted without difficulty.  Stability was again assessed and felt to be satisfactory.  The knee was placed in full extension while the cement hardened and all marginal cement was removed using a [place] elevator and dressing forceps.    The joint capsule was then closed using #1 Vicryl suture.  3-0 Monocryl was used for underlying subcutaneous tissues.  4-0 Monocryl was used for the skin.  Skin glue was then used on the skin.  Dry sterile bulky dressing was applied.  Patient was placed in a knee immobilizer.  Patient was taken to postanesthesia care unit in good

## 2024-12-13 LAB
ANION GAP SERPL CALCULATED.3IONS-SCNC: 8 MEQ/L (ref 9–15)
BUN SERPL-MCNC: 15 MG/DL (ref 8–23)
CALCIUM SERPL-MCNC: 8 MG/DL (ref 8.5–9.9)
CHLORIDE SERPL-SCNC: 103 MEQ/L (ref 95–107)
CO2 SERPL-SCNC: 25 MEQ/L (ref 20–31)
CREAT SERPL-MCNC: 0.7 MG/DL (ref 0.7–1.2)
ERYTHROCYTE [DISTWIDTH] IN BLOOD BY AUTOMATED COUNT: 13.1 % (ref 11.5–14.5)
ESTIMATED AVERAGE GLUCOSE: 94 MG/DL
GLUCOSE BLD-MCNC: 105 MG/DL (ref 70–99)
GLUCOSE BLD-MCNC: 107 MG/DL (ref 70–99)
GLUCOSE BLD-MCNC: 107 MG/DL (ref 70–99)
GLUCOSE BLD-MCNC: 110 MG/DL (ref 70–99)
GLUCOSE SERPL-MCNC: 107 MG/DL (ref 70–99)
HBA1C MFR BLD: 4.9 % (ref 4–6)
HCT VFR BLD AUTO: 32.1 % (ref 42–52)
HGB BLD-MCNC: 11.3 G/DL (ref 14–18)
MCH RBC QN AUTO: 32.8 PG (ref 27–31.3)
MCHC RBC AUTO-ENTMCNC: 35.2 % (ref 33–37)
MCV RBC AUTO: 93 FL (ref 79–92.2)
PERFORMED ON: ABNORMAL
PLATELET # BLD AUTO: 168 K/UL (ref 130–400)
POTASSIUM SERPL-SCNC: 4 MEQ/L (ref 3.4–4.9)
RBC # BLD AUTO: 3.45 M/UL (ref 4.7–6.1)
SODIUM SERPL-SCNC: 136 MEQ/L (ref 135–144)
WBC # BLD AUTO: 8.6 K/UL (ref 4.8–10.8)

## 2024-12-13 PROCEDURE — 6370000000 HC RX 637 (ALT 250 FOR IP): Performed by: FAMILY MEDICINE

## 2024-12-13 PROCEDURE — 97535 SELF CARE MNGMENT TRAINING: CPT

## 2024-12-13 PROCEDURE — 2700000000 HC OXYGEN THERAPY PER DAY

## 2024-12-13 PROCEDURE — 6360000002 HC RX W HCPCS: Performed by: NURSE PRACTITIONER

## 2024-12-13 PROCEDURE — 2580000003 HC RX 258: Performed by: NURSE PRACTITIONER

## 2024-12-13 PROCEDURE — G0378 HOSPITAL OBSERVATION PER HR: HCPCS

## 2024-12-13 PROCEDURE — 85027 COMPLETE CBC AUTOMATED: CPT

## 2024-12-13 PROCEDURE — 80048 BASIC METABOLIC PNL TOTAL CA: CPT

## 2024-12-13 PROCEDURE — 6370000000 HC RX 637 (ALT 250 FOR IP): Performed by: NURSE PRACTITIONER

## 2024-12-13 PROCEDURE — 36415 COLL VENOUS BLD VENIPUNCTURE: CPT

## 2024-12-13 PROCEDURE — 97116 GAIT TRAINING THERAPY: CPT

## 2024-12-13 PROCEDURE — 83036 HEMOGLOBIN GLYCOSYLATED A1C: CPT

## 2024-12-13 PROCEDURE — 97110 THERAPEUTIC EXERCISES: CPT

## 2024-12-13 RX ORDER — OXYCODONE HYDROCHLORIDE 5 MG/1
5 TABLET ORAL EVERY 4 HOURS PRN
Qty: 40 TABLET | Refills: 0 | Status: SHIPPED | OUTPATIENT
Start: 2024-12-13 | End: 2024-12-17

## 2024-12-13 RX ORDER — SENNA AND DOCUSATE SODIUM 50; 8.6 MG/1; MG/1
1 TABLET, FILM COATED ORAL 2 TIMES DAILY
Qty: 60 TABLET | Refills: 0
Start: 2024-12-13 | End: 2024-12-17

## 2024-12-13 RX ORDER — OXYCODONE AND ACETAMINOPHEN 10; 325 MG/1; MG/1
1 TABLET ORAL EVERY 4 HOURS PRN
Status: DISCONTINUED | OUTPATIENT
Start: 2024-12-13 | End: 2024-12-17 | Stop reason: HOSPADM

## 2024-12-13 RX ORDER — HYDROXYZINE HYDROCHLORIDE 10 MG/1
10 TABLET, FILM COATED ORAL 3 TIMES DAILY
Qty: 15 TABLET | Refills: 0
Start: 2024-12-13 | End: 2024-12-17

## 2024-12-13 RX ORDER — CYCLOBENZAPRINE HCL 10 MG
10 TABLET ORAL 3 TIMES DAILY PRN
Qty: 30 TABLET | Refills: 0
Start: 2024-12-13 | End: 2024-12-17

## 2024-12-13 RX ORDER — ASPIRIN 81 MG/1
81 TABLET ORAL 2 TIMES DAILY
Qty: 60 TABLET | Refills: 0
Start: 2024-12-13 | End: 2024-12-17

## 2024-12-13 RX ADMIN — OXYCODONE AND ACETAMINOPHEN 1 TABLET: 10; 325 TABLET ORAL at 17:27

## 2024-12-13 RX ADMIN — OXYCODONE 5 MG: 5 TABLET ORAL at 08:00

## 2024-12-13 RX ADMIN — OXYCODONE 5 MG: 5 TABLET ORAL at 00:28

## 2024-12-13 RX ADMIN — OXYCODONE 5 MG: 5 TABLET ORAL at 04:03

## 2024-12-13 RX ADMIN — ACETAMINOPHEN 650 MG: 325 TABLET ORAL at 00:27

## 2024-12-13 RX ADMIN — KETOROLAC TROMETHAMINE 7.5 MG: 15 INJECTION, SOLUTION INTRAMUSCULAR; INTRAVENOUS at 00:28

## 2024-12-13 RX ADMIN — ASPIRIN 81 MG: 81 TABLET, COATED ORAL at 20:49

## 2024-12-13 RX ADMIN — HYDROXYZINE HYDROCHLORIDE 10 MG: 10 TABLET, FILM COATED ORAL at 09:35

## 2024-12-13 RX ADMIN — KETOROLAC TROMETHAMINE 7.5 MG: 15 INJECTION, SOLUTION INTRAMUSCULAR; INTRAVENOUS at 05:43

## 2024-12-13 RX ADMIN — Medication 10 ML: at 09:35

## 2024-12-13 RX ADMIN — HYDROMORPHONE HYDROCHLORIDE 0.5 MG: 1 INJECTION, SOLUTION INTRAMUSCULAR; INTRAVENOUS; SUBCUTANEOUS at 03:05

## 2024-12-13 RX ADMIN — ACETAMINOPHEN 650 MG: 325 TABLET ORAL at 05:17

## 2024-12-13 RX ADMIN — CYCLOBENZAPRINE 10 MG: 10 TABLET, FILM COATED ORAL at 21:42

## 2024-12-13 RX ADMIN — SPIRONOLACTONE 50 MG: 50 TABLET ORAL at 05:16

## 2024-12-13 RX ADMIN — CYCLOBENZAPRINE 10 MG: 10 TABLET, FILM COATED ORAL at 00:27

## 2024-12-13 RX ADMIN — SENNOSIDES AND DOCUSATE SODIUM 1 TABLET: 50; 8.6 TABLET ORAL at 20:49

## 2024-12-13 RX ADMIN — HYDROXYZINE HYDROCHLORIDE 10 MG: 10 TABLET, FILM COATED ORAL at 13:34

## 2024-12-13 RX ADMIN — ASPIRIN 81 MG: 81 TABLET, COATED ORAL at 09:35

## 2024-12-13 RX ADMIN — LISINOPRIL 40 MG: 20 TABLET ORAL at 09:35

## 2024-12-13 RX ADMIN — HYDROXYZINE HYDROCHLORIDE 10 MG: 10 TABLET, FILM COATED ORAL at 20:48

## 2024-12-13 RX ADMIN — Medication 10 ML: at 20:49

## 2024-12-13 RX ADMIN — ACETAMINOPHEN 650 MG: 325 TABLET ORAL at 11:54

## 2024-12-13 RX ADMIN — ACETAMINOPHEN 650 MG: 325 TABLET ORAL at 17:22

## 2024-12-13 RX ADMIN — OXYCODONE AND ACETAMINOPHEN 1 TABLET: 10; 325 TABLET ORAL at 21:43

## 2024-12-13 RX ADMIN — WATER 3000 MG: 1 INJECTION INTRAMUSCULAR; INTRAVENOUS; SUBCUTANEOUS at 00:29

## 2024-12-13 RX ADMIN — TRANEXAMIC ACID 1950 MG: 650 TABLET ORAL at 05:16

## 2024-12-13 RX ADMIN — PANTOPRAZOLE SODIUM 40 MG: 40 TABLET, DELAYED RELEASE ORAL at 05:16

## 2024-12-13 RX ADMIN — SENNOSIDES AND DOCUSATE SODIUM 1 TABLET: 50; 8.6 TABLET ORAL at 09:35

## 2024-12-13 RX ADMIN — ATORVASTATIN CALCIUM 40 MG: 40 TABLET, FILM COATED ORAL at 05:17

## 2024-12-13 RX ADMIN — ACETAMINOPHEN 650 MG: 325 TABLET ORAL at 23:41

## 2024-12-13 RX ADMIN — OXYCODONE 5 MG: 5 TABLET ORAL at 13:34

## 2024-12-13 ASSESSMENT — PAIN DESCRIPTION - ORIENTATION
ORIENTATION: RIGHT

## 2024-12-13 ASSESSMENT — PAIN DESCRIPTION - LOCATION
LOCATION: KNEE

## 2024-12-13 ASSESSMENT — PAIN SCALES - GENERAL
PAINLEVEL_OUTOF10: 6
PAINLEVEL_OUTOF10: 5
PAINLEVEL_OUTOF10: 9
PAINLEVEL_OUTOF10: 5
PAINLEVEL_OUTOF10: 9
PAINLEVEL_OUTOF10: 8
PAINLEVEL_OUTOF10: 9
PAINLEVEL_OUTOF10: 5
PAINLEVEL_OUTOF10: 10
PAINLEVEL_OUTOF10: 4
PAINLEVEL_OUTOF10: 10
PAINLEVEL_OUTOF10: 5
PAINLEVEL_OUTOF10: 7
PAINLEVEL_OUTOF10: 10

## 2024-12-13 ASSESSMENT — PAIN DESCRIPTION - DESCRIPTORS
DESCRIPTORS: ACHING;SHARP
DESCRIPTORS: ACHING
DESCRIPTORS: ACHING;SHARP
DESCRIPTORS: ACHING
DESCRIPTORS: SHOOTING
DESCRIPTORS: ACHING
DESCRIPTORS: ACHING;SHARP
DESCRIPTORS: ACHING;SHARP
DESCRIPTORS: ACHING
DESCRIPTORS: ACHING
DESCRIPTORS: ACHING;SHARP

## 2024-12-13 NOTE — PLAN OF CARE
Problem: Chronic Conditions and Co-morbidities  Goal: Patient's chronic conditions and co-morbidity symptoms are monitored and maintained or improved  12/13/2024 0222 by Rufus Linda RN  Outcome: Progressing  12/12/2024 1258 by Zenaida Bueno RN  Outcome: Progressing     Problem: Discharge Planning  Goal: Discharge to home or other facility with appropriate resources  12/13/2024 0222 by Rufus Linda RN  Outcome: Progressing  12/12/2024 1258 by Zenaida Bueno RN  Outcome: Progressing     Problem: Pain  Goal: Verbalizes/displays adequate comfort level or baseline comfort level  12/13/2024 0222 by Rufus Linda RN  Outcome: Progressing  12/12/2024 1258 by Zenaida Bueno RN  Outcome: Progressing     Problem: Safety - Adult  Goal: Free from fall injury  12/13/2024 0222 by Rufus Linda RN  Outcome: Progressing  12/12/2024 1258 by Zenaida Bueno RN  Outcome: Progressing     Problem: Skin/Tissue Integrity  Goal: Absence of new skin breakdown  Description: 1.  Monitor for areas of redness and/or skin breakdown  2.  Assess vascular access sites hourly  3.  Every 4-6 hours minimum:  Change oxygen saturation probe site  4.  Every 4-6 hours:  If on nasal continuous positive airway pressure, respiratory therapy assess nares and determine need for appliance change or resting period.  12/13/2024 0222 by Rufus Linda RN  Outcome: Progressing  12/12/2024 1258 by Zenaida Bueno RN  Outcome: Progressing     Problem: Physical Therapy - Adult  Goal: By Discharge: Performs mobility at highest level of function for planned discharge setting.  See evaluation for individualized goals.  12/12/2024 1543 by Roro Crain, PT  Outcome: Progressing     Problem: Occupational Therapy - Adult  Goal: By Discharge: Performs self-care activities at highest level of function for planned discharge setting.  See evaluation for individualized goals.  12/12/2024 1544 by Shellie Pressley, OTR/L  Outcome: Progressing

## 2024-12-13 NOTE — PLAN OF CARE
Problem: Chronic Conditions and Co-morbidities  Goal: Patient's chronic conditions and co-morbidity symptoms are monitored and maintained or improved  12/13/2024 0944 by Bianca Mix RN  Outcome: Progressing  12/13/2024 0222 by Rufus Linda RN  Outcome: Progressing     Problem: Discharge Planning  Goal: Discharge to home or other facility with appropriate resources  12/13/2024 0944 by Bianca Mix RN  Outcome: Progressing  12/13/2024 0222 by Rufus Linda RN  Outcome: Progressing     Problem: Pain  Goal: Verbalizes/displays adequate comfort level or baseline comfort level  12/13/2024 0944 by Bianca Mix RN  Outcome: Progressing  12/13/2024 0222 by Rufus Linda RN  Outcome: Progressing     Problem: Safety - Adult  Goal: Free from fall injury  12/13/2024 0944 by Bianca Mix RN  Outcome: Progressing  12/13/2024 0222 by Rufus Linda RN  Outcome: Progressing     Problem: Skin/Tissue Integrity  Goal: Absence of new skin breakdown  Description: 1.  Monitor for areas of redness and/or skin breakdown  2.  Assess vascular access sites hourly  3.  Every 4-6 hours minimum:  Change oxygen saturation probe site  4.  Every 4-6 hours:  If on nasal continuous positive airway pressure, respiratory therapy assess nares and determine need for appliance change or resting period.  12/13/2024 0944 by Bianca Mix RN  Outcome: Progressing  12/13/2024 0222 by Rufus Linda RN  Outcome: Progressing

## 2024-12-13 NOTE — CARE COORDINATION
Rubiow spoke with Marina with [facility] and as of the time of this note there is no precert approval.

## 2024-12-14 LAB
GLUCOSE BLD-MCNC: 105 MG/DL (ref 70–99)
GLUCOSE BLD-MCNC: 115 MG/DL (ref 70–99)
GLUCOSE BLD-MCNC: 88 MG/DL (ref 70–99)
GLUCOSE BLD-MCNC: 98 MG/DL (ref 70–99)
PERFORMED ON: ABNORMAL
PERFORMED ON: ABNORMAL
PERFORMED ON: NORMAL
PERFORMED ON: NORMAL

## 2024-12-14 PROCEDURE — 6370000000 HC RX 637 (ALT 250 FOR IP): Performed by: FAMILY MEDICINE

## 2024-12-14 PROCEDURE — G0378 HOSPITAL OBSERVATION PER HR: HCPCS

## 2024-12-14 PROCEDURE — 97116 GAIT TRAINING THERAPY: CPT

## 2024-12-14 PROCEDURE — 2580000003 HC RX 258: Performed by: NURSE PRACTITIONER

## 2024-12-14 PROCEDURE — 6370000000 HC RX 637 (ALT 250 FOR IP): Performed by: NURSE PRACTITIONER

## 2024-12-14 PROCEDURE — 2700000000 HC OXYGEN THERAPY PER DAY

## 2024-12-14 PROCEDURE — 97535 SELF CARE MNGMENT TRAINING: CPT

## 2024-12-14 RX ADMIN — ATORVASTATIN CALCIUM 40 MG: 40 TABLET, FILM COATED ORAL at 06:09

## 2024-12-14 RX ADMIN — PANTOPRAZOLE SODIUM 40 MG: 40 TABLET, DELAYED RELEASE ORAL at 06:09

## 2024-12-14 RX ADMIN — SPIRONOLACTONE 50 MG: 50 TABLET ORAL at 06:09

## 2024-12-14 RX ADMIN — OXYCODONE AND ACETAMINOPHEN 1 TABLET: 10; 325 TABLET ORAL at 16:06

## 2024-12-14 RX ADMIN — HYDROXYZINE HYDROCHLORIDE 10 MG: 10 TABLET, FILM COATED ORAL at 08:22

## 2024-12-14 RX ADMIN — LISINOPRIL 40 MG: 20 TABLET ORAL at 08:22

## 2024-12-14 RX ADMIN — OXYCODONE AND ACETAMINOPHEN 1 TABLET: 10; 325 TABLET ORAL at 01:48

## 2024-12-14 RX ADMIN — OXYCODONE AND ACETAMINOPHEN 1 TABLET: 10; 325 TABLET ORAL at 22:39

## 2024-12-14 RX ADMIN — SENNOSIDES AND DOCUSATE SODIUM 1 TABLET: 50; 8.6 TABLET ORAL at 21:03

## 2024-12-14 RX ADMIN — Medication 10 ML: at 08:22

## 2024-12-14 RX ADMIN — SENNOSIDES AND DOCUSATE SODIUM 1 TABLET: 50; 8.6 TABLET ORAL at 08:22

## 2024-12-14 RX ADMIN — OXYCODONE AND ACETAMINOPHEN 1 TABLET: 10; 325 TABLET ORAL at 06:13

## 2024-12-14 RX ADMIN — OXYCODONE AND ACETAMINOPHEN 1 TABLET: 10; 325 TABLET ORAL at 10:43

## 2024-12-14 RX ADMIN — CYCLOBENZAPRINE 10 MG: 10 TABLET, FILM COATED ORAL at 08:22

## 2024-12-14 RX ADMIN — Medication 10 ML: at 21:03

## 2024-12-14 RX ADMIN — ASPIRIN 81 MG: 81 TABLET, COATED ORAL at 08:22

## 2024-12-14 RX ADMIN — HYDROXYZINE HYDROCHLORIDE 10 MG: 10 TABLET, FILM COATED ORAL at 16:06

## 2024-12-14 RX ADMIN — HYDROXYZINE HYDROCHLORIDE 10 MG: 10 TABLET, FILM COATED ORAL at 22:40

## 2024-12-14 RX ADMIN — ACETAMINOPHEN 650 MG: 325 TABLET ORAL at 06:08

## 2024-12-14 RX ADMIN — ASPIRIN 81 MG: 81 TABLET, COATED ORAL at 21:03

## 2024-12-14 ASSESSMENT — PAIN SCALES - GENERAL
PAINLEVEL_OUTOF10: 2
PAINLEVEL_OUTOF10: 7
PAINLEVEL_OUTOF10: 8
PAINLEVEL_OUTOF10: 2
PAINLEVEL_OUTOF10: 4
PAINLEVEL_OUTOF10: 6
PAINLEVEL_OUTOF10: 2
PAINLEVEL_OUTOF10: 3
PAINLEVEL_OUTOF10: 4
PAINLEVEL_OUTOF10: 2
PAINLEVEL_OUTOF10: 2

## 2024-12-14 ASSESSMENT — PAIN DESCRIPTION - DESCRIPTORS
DESCRIPTORS: THROBBING
DESCRIPTORS: ACHING;THROBBING
DESCRIPTORS: ACHING
DESCRIPTORS: ACHING;THROBBING
DESCRIPTORS: ACHING

## 2024-12-14 ASSESSMENT — PAIN DESCRIPTION - LOCATION
LOCATION: KNEE

## 2024-12-14 ASSESSMENT — PAIN DESCRIPTION - ORIENTATION
ORIENTATION: RIGHT

## 2024-12-14 NOTE — PLAN OF CARE
Problem: Chronic Conditions and Co-morbidities  Goal: Patient's chronic conditions and co-morbidity symptoms are monitored and maintained or improved  12/14/2024 0941 by Vanessa Mccall RN  Outcome: Progressing  12/13/2024 2233 by Robi Finn RN  Outcome: Progressing     Problem: Discharge Planning  Goal: Discharge to home or other facility with appropriate resources  12/14/2024 0941 by Vanessa Mccall RN  Outcome: Progressing  12/13/2024 2233 by Robi Finn RN  Outcome: Progressing     Problem: Pain  Goal: Verbalizes/displays adequate comfort level or baseline comfort level  12/14/2024 0941 by Vanessa Mccall RN  Outcome: Progressing  12/13/2024 2233 by Robi Finn RN  Outcome: Progressing     Problem: Safety - Adult  Goal: Free from fall injury  12/14/2024 0941 by Vanessa Mccall RN  Outcome: Progressing  12/13/2024 2233 by Robi Finn RN  Outcome: Progressing     Problem: Skin/Tissue Integrity  Goal: Absence of new skin breakdown  Description: 1.  Monitor for areas of redness and/or skin breakdown  2.  Assess vascular access sites hourly  3.  Every 4-6 hours minimum:  Change oxygen saturation probe site  4.  Every 4-6 hours:  If on nasal continuous positive airway pressure, respiratory therapy assess nares and determine need for appliance change or resting period.  12/14/2024 0941 by Vanessa Mccall RN  Outcome: Progressing  12/13/2024 2233 by Robi Finn RN  Outcome: Progressing

## 2024-12-15 LAB
GLUCOSE BLD-MCNC: 100 MG/DL (ref 70–99)
GLUCOSE BLD-MCNC: 97 MG/DL (ref 70–99)
GLUCOSE BLD-MCNC: 98 MG/DL (ref 70–99)
GLUCOSE BLD-MCNC: 99 MG/DL (ref 70–99)
PERFORMED ON: ABNORMAL
PERFORMED ON: NORMAL

## 2024-12-15 PROCEDURE — 2580000003 HC RX 258: Performed by: NURSE PRACTITIONER

## 2024-12-15 PROCEDURE — 6370000000 HC RX 637 (ALT 250 FOR IP): Performed by: NURSE PRACTITIONER

## 2024-12-15 PROCEDURE — G0378 HOSPITAL OBSERVATION PER HR: HCPCS

## 2024-12-15 PROCEDURE — 2700000000 HC OXYGEN THERAPY PER DAY

## 2024-12-15 PROCEDURE — 97110 THERAPEUTIC EXERCISES: CPT

## 2024-12-15 PROCEDURE — 97116 GAIT TRAINING THERAPY: CPT

## 2024-12-15 PROCEDURE — 6370000000 HC RX 637 (ALT 250 FOR IP): Performed by: FAMILY MEDICINE

## 2024-12-15 RX ORDER — POLYETHYLENE GLYCOL 3350 17 G/17G
17 POWDER, FOR SOLUTION ORAL 2 TIMES DAILY
Status: DISCONTINUED | OUTPATIENT
Start: 2024-12-15 | End: 2024-12-17 | Stop reason: HOSPADM

## 2024-12-15 RX ADMIN — CYCLOBENZAPRINE 10 MG: 10 TABLET, FILM COATED ORAL at 03:42

## 2024-12-15 RX ADMIN — ATORVASTATIN CALCIUM 40 MG: 40 TABLET, FILM COATED ORAL at 05:43

## 2024-12-15 RX ADMIN — HYDROXYZINE HYDROCHLORIDE 10 MG: 10 TABLET, FILM COATED ORAL at 20:19

## 2024-12-15 RX ADMIN — OXYCODONE AND ACETAMINOPHEN 1 TABLET: 10; 325 TABLET ORAL at 14:27

## 2024-12-15 RX ADMIN — SENNOSIDES AND DOCUSATE SODIUM 1 TABLET: 50; 8.6 TABLET ORAL at 08:44

## 2024-12-15 RX ADMIN — OXYCODONE AND ACETAMINOPHEN 1 TABLET: 10; 325 TABLET ORAL at 08:44

## 2024-12-15 RX ADMIN — OXYCODONE AND ACETAMINOPHEN 1 TABLET: 10; 325 TABLET ORAL at 23:32

## 2024-12-15 RX ADMIN — ASPIRIN 81 MG: 81 TABLET, COATED ORAL at 20:19

## 2024-12-15 RX ADMIN — POLYETHYLENE GLYCOL 3350 17 G: 17 POWDER, FOR SOLUTION ORAL at 20:19

## 2024-12-15 RX ADMIN — SENNOSIDES AND DOCUSATE SODIUM 1 TABLET: 50; 8.6 TABLET ORAL at 20:19

## 2024-12-15 RX ADMIN — OXYCODONE AND ACETAMINOPHEN 1 TABLET: 10; 325 TABLET ORAL at 18:28

## 2024-12-15 RX ADMIN — Medication 10 ML: at 08:47

## 2024-12-15 RX ADMIN — Medication 10 ML: at 20:19

## 2024-12-15 RX ADMIN — CYCLOBENZAPRINE 10 MG: 10 TABLET, FILM COATED ORAL at 23:32

## 2024-12-15 RX ADMIN — HYDROXYZINE HYDROCHLORIDE 10 MG: 10 TABLET, FILM COATED ORAL at 08:44

## 2024-12-15 RX ADMIN — PANTOPRAZOLE SODIUM 40 MG: 40 TABLET, DELAYED RELEASE ORAL at 05:43

## 2024-12-15 RX ADMIN — ASPIRIN 81 MG: 81 TABLET, COATED ORAL at 08:44

## 2024-12-15 RX ADMIN — SPIRONOLACTONE 50 MG: 50 TABLET ORAL at 05:43

## 2024-12-15 RX ADMIN — OXYCODONE AND ACETAMINOPHEN 1 TABLET: 10; 325 TABLET ORAL at 03:42

## 2024-12-15 ASSESSMENT — PAIN DESCRIPTION - ORIENTATION
ORIENTATION: RIGHT

## 2024-12-15 ASSESSMENT — PAIN SCALES - GENERAL
PAINLEVEL_OUTOF10: 2
PAINLEVEL_OUTOF10: 5
PAINLEVEL_OUTOF10: 2
PAINLEVEL_OUTOF10: 6
PAINLEVEL_OUTOF10: 5
PAINLEVEL_OUTOF10: 4
PAINLEVEL_OUTOF10: 2
PAINLEVEL_OUTOF10: 5
PAINLEVEL_OUTOF10: 1

## 2024-12-15 ASSESSMENT — PAIN DESCRIPTION - LOCATION
LOCATION: KNEE

## 2024-12-15 ASSESSMENT — PAIN DESCRIPTION - DESCRIPTORS
DESCRIPTORS: ACHING
DESCRIPTORS: ACHING;THROBBING
DESCRIPTORS: ACHING
DESCRIPTORS: ACHING;THROBBING
DESCRIPTORS: ACHING
DESCRIPTORS: ACHING;THROBBING

## 2024-12-15 NOTE — CARE COORDINATION
RECEIVED CALL FROM RUBEN AT [facility]. STATES PRECERT WAS NOT STARTED FOR KANU ROMO. STATES ANTHEM PLAN TOOK LONGER THAN EXPECTED TO VERIFY AND THAT PT IS RESPONSIBLE FOR 20% UNTIL OOP IS MET. SHE STATES OOP IS $6350 AND HE HAS MET $2306.51 TO DATE.   MET WITH PT AT BEDSIDE. WE REVIEWED HIS INSURANCE CARD, THAT OOP AMOUNT IS LISTED AS THE OUT OF NETWORK AMOUNT. WE DISCUSSED PT/OT EVALS. PT STATES HE LIVES ALONE AND WOULD STILL LIKE SNF AT [place]. FOC OFFERED, PT WOULD LIKE TO SEE IF [facility] IS AN IN NETWORK PROVIDER. IF THIS OOP IS FOR ALL SNF/ HE STATES HE WILL RETURN HOME W/HHC BECAUSE HE CANNOT AFFORD TO PAY 20%.   REFERRAL CALLED TO TRACI AT [facility]. SHE WILL CHECK BENEFIT IF ABLE TO TODAY VS TOMORROW. RUBEN OF [facility] NOTIFIED TO HOLD OFF ON STARTING A PRECERT AT THIS TIME UNTIL WE VERIFY INS. PLAN.

## 2024-12-15 NOTE — PLAN OF CARE
Problem: Chronic Conditions and Co-morbidities  Goal: Patient's chronic conditions and co-morbidity symptoms are monitored and maintained or improved  12/14/2024 2306 by Celeste Cerna RN  Outcome: Progressing  12/14/2024 0941 by Vanessa Mccall RN  Outcome: Progressing     Problem: Discharge Planning  Goal: Discharge to home or other facility with appropriate resources  12/14/2024 2306 by Celeste Cerna RN  Outcome: Progressing  12/14/2024 0941 by Vanessa Mccall RN  Outcome: Progressing     Problem: Pain  Goal: Verbalizes/displays adequate comfort level or baseline comfort level  12/14/2024 2306 by Celeste Cerna RN  Outcome: Progressing  12/14/2024 0941 by Vanessa Mccall RN  Outcome: Progressing     Problem: Safety - Adult  Goal: Free from fall injury  12/14/2024 2306 by Celeste Cerna RN  Outcome: Progressing  12/14/2024 0941 by Vanessa Mccall RN  Outcome: Progressing     Problem: Skin/Tissue Integrity  Goal: Absence of new skin breakdown  Description: 1.  Monitor for areas of redness and/or skin breakdown  2.  Assess vascular access sites hourly  3.  Every 4-6 hours minimum:  Change oxygen saturation probe site  4.  Every 4-6 hours:  If on nasal continuous positive airway pressure, respiratory therapy assess nares and determine need for appliance change or resting period.  12/14/2024 2306 by Celeste Cerna RN  Outcome: Progressing  12/14/2024 0941 by Vanessa Mccall RN  Outcome: Progressing

## 2024-12-15 NOTE — PLAN OF CARE
Problem: Chronic Conditions and Co-morbidities  Goal: Patient's chronic conditions and co-morbidity symptoms are monitored and maintained or improved  12/15/2024 0946 by Vanessa Mccall RN  Outcome: Progressing  12/14/2024 2306 by Celeste Cerna RN  Outcome: Progressing     Problem: Discharge Planning  Goal: Discharge to home or other facility with appropriate resources  12/15/2024 0946 by Vanessa Mccall RN  Outcome: Progressing  12/14/2024 2306 by Celeste Cerna RN  Outcome: Progressing     Problem: Pain  Goal: Verbalizes/displays adequate comfort level or baseline comfort level  12/15/2024 0946 by Vanessa Mccall RN  Outcome: Progressing  12/14/2024 2306 by Celeste Cerna RN  Outcome: Progressing     Problem: Safety - Adult  Goal: Free from fall injury  12/15/2024 0946 by Vanessa Mccall RN  Outcome: Progressing  12/14/2024 2306 by Celeste Cerna RN  Outcome: Progressing     Problem: Skin/Tissue Integrity  Goal: Absence of new skin breakdown  Description: 1.  Monitor for areas of redness and/or skin breakdown  2.  Assess vascular access sites hourly  3.  Every 4-6 hours minimum:  Change oxygen saturation probe site  4.  Every 4-6 hours:  If on nasal continuous positive airway pressure, respiratory therapy assess nares and determine need for appliance change or resting period.  12/15/2024 0946 by Vanessa Mccall RN  Outcome: Progressing  12/14/2024 2306 by Celeste Cerna RN  Outcome: Progressing

## 2024-12-16 LAB
GLUCOSE BLD-MCNC: 129 MG/DL (ref 70–99)
GLUCOSE BLD-MCNC: 76 MG/DL (ref 70–99)
GLUCOSE BLD-MCNC: 91 MG/DL (ref 70–99)
GLUCOSE BLD-MCNC: 96 MG/DL (ref 70–99)
PERFORMED ON: ABNORMAL
PERFORMED ON: NORMAL

## 2024-12-16 PROCEDURE — G0378 HOSPITAL OBSERVATION PER HR: HCPCS

## 2024-12-16 PROCEDURE — 97535 SELF CARE MNGMENT TRAINING: CPT

## 2024-12-16 PROCEDURE — 2580000003 HC RX 258: Performed by: NURSE PRACTITIONER

## 2024-12-16 PROCEDURE — 6370000000 HC RX 637 (ALT 250 FOR IP): Performed by: FAMILY MEDICINE

## 2024-12-16 PROCEDURE — 6370000000 HC RX 637 (ALT 250 FOR IP): Performed by: INTERNAL MEDICINE

## 2024-12-16 PROCEDURE — 97116 GAIT TRAINING THERAPY: CPT

## 2024-12-16 PROCEDURE — 6370000000 HC RX 637 (ALT 250 FOR IP): Performed by: NURSE PRACTITIONER

## 2024-12-16 RX ADMIN — OXYCODONE AND ACETAMINOPHEN 1 TABLET: 10; 325 TABLET ORAL at 15:50

## 2024-12-16 RX ADMIN — HYDROXYZINE HYDROCHLORIDE 10 MG: 10 TABLET, FILM COATED ORAL at 21:26

## 2024-12-16 RX ADMIN — HYDROXYZINE HYDROCHLORIDE 10 MG: 10 TABLET, FILM COATED ORAL at 14:03

## 2024-12-16 RX ADMIN — ATORVASTATIN CALCIUM 40 MG: 40 TABLET, FILM COATED ORAL at 05:09

## 2024-12-16 RX ADMIN — ASPIRIN 81 MG: 81 TABLET, COATED ORAL at 08:58

## 2024-12-16 RX ADMIN — LISINOPRIL 40 MG: 20 TABLET ORAL at 08:58

## 2024-12-16 RX ADMIN — POLYETHYLENE GLYCOL 3350 17 G: 17 POWDER, FOR SOLUTION ORAL at 21:27

## 2024-12-16 RX ADMIN — POLYETHYLENE GLYCOL 3350 17 G: 17 POWDER, FOR SOLUTION ORAL at 08:59

## 2024-12-16 RX ADMIN — Medication 10 ML: at 09:08

## 2024-12-16 RX ADMIN — PANTOPRAZOLE SODIUM 40 MG: 40 TABLET, DELAYED RELEASE ORAL at 05:09

## 2024-12-16 RX ADMIN — SENNOSIDES AND DOCUSATE SODIUM 1 TABLET: 50; 8.6 TABLET ORAL at 08:58

## 2024-12-16 RX ADMIN — NALOXEGOL OXALATE 25 MG: 25 TABLET, FILM COATED ORAL at 14:03

## 2024-12-16 RX ADMIN — OXYCODONE AND ACETAMINOPHEN 1 TABLET: 10; 325 TABLET ORAL at 05:09

## 2024-12-16 RX ADMIN — SENNOSIDES AND DOCUSATE SODIUM 1 TABLET: 50; 8.6 TABLET ORAL at 21:26

## 2024-12-16 RX ADMIN — HYDROXYZINE HYDROCHLORIDE 10 MG: 10 TABLET, FILM COATED ORAL at 08:58

## 2024-12-16 RX ADMIN — ASPIRIN 81 MG: 81 TABLET, COATED ORAL at 21:26

## 2024-12-16 RX ADMIN — SPIRONOLACTONE 50 MG: 50 TABLET ORAL at 05:09

## 2024-12-16 RX ADMIN — OXYCODONE AND ACETAMINOPHEN 1 TABLET: 10; 325 TABLET ORAL at 10:53

## 2024-12-16 RX ADMIN — OXYCODONE AND ACETAMINOPHEN 1 TABLET: 10; 325 TABLET ORAL at 21:26

## 2024-12-16 ASSESSMENT — PAIN DESCRIPTION - ORIENTATION
ORIENTATION: RIGHT

## 2024-12-16 ASSESSMENT — PAIN DESCRIPTION - DESCRIPTORS
DESCRIPTORS: ACHING

## 2024-12-16 ASSESSMENT — PAIN DESCRIPTION - LOCATION
LOCATION: KNEE

## 2024-12-16 ASSESSMENT — PAIN SCALES - GENERAL
PAINLEVEL_OUTOF10: 7
PAINLEVEL_OUTOF10: 6
PAINLEVEL_OUTOF10: 4
PAINLEVEL_OUTOF10: 3
PAINLEVEL_OUTOF10: 2
PAINLEVEL_OUTOF10: 5

## 2024-12-16 NOTE — CARE COORDINATION
CAMILAW SPOKE WITH RYAN AT [facility].  THEY RECEIVED THE REFERRAL LATE YESTERDAY AND PRECERT WILL BE STARTED TODAY.

## 2024-12-16 NOTE — CARE COORDINATION
[facility] benefits obtained and clinicals faxed for precert.  Awaiting their review and decision.

## 2024-12-16 NOTE — PLAN OF CARE
Problem: Chronic Conditions and Co-morbidities  Goal: Patient's chronic conditions and co-morbidity symptoms are monitored and maintained or improved  12/15/2024 2338 by Celeste Cerna RN  Outcome: Progressing  12/15/2024 0946 by Vanessa Mccall RN  Outcome: Progressing     Problem: Discharge Planning  Goal: Discharge to home or other facility with appropriate resources  12/15/2024 2338 by Celeste Cerna RN  Outcome: Progressing  12/15/2024 0946 by Vanessa Mccall RN  Outcome: Progressing     Problem: Pain  Goal: Verbalizes/displays adequate comfort level or baseline comfort level  12/15/2024 2338 by Celeste Cerna RN  Outcome: Progressing  12/15/2024 0946 by Vanessa Mccall RN  Outcome: Progressing     Problem: Safety - Adult  Goal: Free from fall injury  12/15/2024 2338 by Celeste Cerna RN  Outcome: Progressing  12/15/2024 0946 by Vanessa Mccall RN  Outcome: Progressing     Problem: Skin/Tissue Integrity  Goal: Absence of new skin breakdown  Description: 1.  Monitor for areas of redness and/or skin breakdown  2.  Assess vascular access sites hourly  3.  Every 4-6 hours minimum:  Change oxygen saturation probe site  4.  Every 4-6 hours:  If on nasal continuous positive airway pressure, respiratory therapy assess nares and determine need for appliance change or resting period.  12/15/2024 2338 by Celeste Cerna RN  Outcome: Progressing  12/15/2024 0946 by Vanessa Mccall RN  Outcome: Progressing

## 2024-12-17 VITALS
HEART RATE: 72 BPM | HEIGHT: 70 IN | RESPIRATION RATE: 18 BRPM | OXYGEN SATURATION: 98 % | BODY MASS INDEX: 43.09 KG/M2 | TEMPERATURE: 97.9 F | DIASTOLIC BLOOD PRESSURE: 53 MMHG | SYSTOLIC BLOOD PRESSURE: 91 MMHG | WEIGHT: 301 LBS

## 2024-12-17 LAB
GLUCOSE BLD-MCNC: 95 MG/DL (ref 70–99)
PERFORMED ON: NORMAL

## 2024-12-17 PROCEDURE — 6370000000 HC RX 637 (ALT 250 FOR IP): Performed by: FAMILY MEDICINE

## 2024-12-17 PROCEDURE — G0378 HOSPITAL OBSERVATION PER HR: HCPCS

## 2024-12-17 PROCEDURE — 2580000003 HC RX 258: Performed by: NURSE PRACTITIONER

## 2024-12-17 PROCEDURE — 6370000000 HC RX 637 (ALT 250 FOR IP): Performed by: NURSE PRACTITIONER

## 2024-12-17 PROCEDURE — 97535 SELF CARE MNGMENT TRAINING: CPT

## 2024-12-17 PROCEDURE — 97116 GAIT TRAINING THERAPY: CPT

## 2024-12-17 RX ORDER — OXYCODONE HYDROCHLORIDE 10 MG/1
10 TABLET ORAL EVERY 6 HOURS PRN
Qty: 28 TABLET | Refills: 0 | Status: SHIPPED | OUTPATIENT
Start: 2024-12-17 | End: 2024-12-24

## 2024-12-17 RX ORDER — PREDNISONE 20 MG/1
20 TABLET ORAL ONCE
Status: COMPLETED | OUTPATIENT
Start: 2024-12-17 | End: 2024-12-17

## 2024-12-17 RX ORDER — CYCLOBENZAPRINE HCL 10 MG
10 TABLET ORAL 3 TIMES DAILY PRN
Qty: 15 TABLET | Refills: 0 | Status: SHIPPED | OUTPATIENT
Start: 2024-12-17 | End: 2024-12-22

## 2024-12-17 RX ORDER — HYDROXYZINE HYDROCHLORIDE 10 MG/1
10 TABLET, FILM COATED ORAL 3 TIMES DAILY
Qty: 15 TABLET | Refills: 0 | Status: SHIPPED | OUTPATIENT
Start: 2024-12-17 | End: 2024-12-22

## 2024-12-17 RX ORDER — OXYCODONE HYDROCHLORIDE 5 MG/1
5 TABLET ORAL EVERY 6 HOURS PRN
Qty: 28 TABLET | Refills: 0 | Status: SHIPPED | OUTPATIENT
Start: 2024-12-17 | End: 2024-12-17 | Stop reason: HOSPADM

## 2024-12-17 RX ORDER — ASPIRIN 81 MG/1
81 TABLET ORAL 2 TIMES DAILY
Qty: 60 TABLET | Refills: 0 | Status: SHIPPED | OUTPATIENT
Start: 2024-12-17 | End: 2025-01-16

## 2024-12-17 RX ORDER — SENNA AND DOCUSATE SODIUM 50; 8.6 MG/1; MG/1
1 TABLET, FILM COATED ORAL 2 TIMES DAILY
Qty: 60 TABLET | Refills: 0 | Status: SHIPPED | OUTPATIENT
Start: 2024-12-17 | End: 2025-01-16

## 2024-12-17 RX ADMIN — OXYCODONE AND ACETAMINOPHEN 1 TABLET: 10; 325 TABLET ORAL at 09:21

## 2024-12-17 RX ADMIN — HYDROXYZINE HYDROCHLORIDE 10 MG: 10 TABLET, FILM COATED ORAL at 15:14

## 2024-12-17 RX ADMIN — PREDNISONE 20 MG: 20 TABLET ORAL at 10:57

## 2024-12-17 RX ADMIN — HYDROXYZINE HYDROCHLORIDE 10 MG: 10 TABLET, FILM COATED ORAL at 09:15

## 2024-12-17 RX ADMIN — Medication 10 ML: at 09:16

## 2024-12-17 RX ADMIN — LISINOPRIL 40 MG: 20 TABLET ORAL at 09:15

## 2024-12-17 RX ADMIN — OXYCODONE AND ACETAMINOPHEN 1 TABLET: 10; 325 TABLET ORAL at 05:38

## 2024-12-17 RX ADMIN — SPIRONOLACTONE 50 MG: 50 TABLET ORAL at 05:38

## 2024-12-17 RX ADMIN — ATORVASTATIN CALCIUM 40 MG: 40 TABLET, FILM COATED ORAL at 05:38

## 2024-12-17 RX ADMIN — OXYCODONE AND ACETAMINOPHEN 1 TABLET: 10; 325 TABLET ORAL at 01:31

## 2024-12-17 RX ADMIN — PANTOPRAZOLE SODIUM 40 MG: 40 TABLET, DELAYED RELEASE ORAL at 05:38

## 2024-12-17 RX ADMIN — ASPIRIN 81 MG: 81 TABLET, COATED ORAL at 09:15

## 2024-12-17 RX ADMIN — OXYCODONE AND ACETAMINOPHEN 1 TABLET: 10; 325 TABLET ORAL at 15:13

## 2024-12-17 RX ADMIN — POLYETHYLENE GLYCOL 3350 17 G: 17 POWDER, FOR SOLUTION ORAL at 09:15

## 2024-12-17 RX ADMIN — SENNOSIDES AND DOCUSATE SODIUM 1 TABLET: 50; 8.6 TABLET ORAL at 09:15

## 2024-12-17 RX ADMIN — CYCLOBENZAPRINE 10 MG: 10 TABLET, FILM COATED ORAL at 09:15

## 2024-12-17 ASSESSMENT — PAIN SCALES - GENERAL
PAINLEVEL_OUTOF10: 4
PAINLEVEL_OUTOF10: 5
PAINLEVEL_OUTOF10: 3
PAINLEVEL_OUTOF10: 3
PAINLEVEL_OUTOF10: 5
PAINLEVEL_OUTOF10: 5
PAINLEVEL_OUTOF10: 4

## 2024-12-17 ASSESSMENT — PAIN DESCRIPTION - LOCATION
LOCATION: KNEE

## 2024-12-17 ASSESSMENT — PAIN DESCRIPTION - DESCRIPTORS
DESCRIPTORS: ACHING
DESCRIPTORS: ACHING

## 2024-12-17 ASSESSMENT — PAIN DESCRIPTION - ORIENTATION
ORIENTATION: LEFT
ORIENTATION: RIGHT

## 2024-12-17 NOTE — DISCHARGE SUMMARY
Discharge summary  This patient Sukumar Clarke was admitted to the hospital on 12/12/2024  after undergoing Procedure(s) (LRB):  RIGHT KNEE TOTAL KNEE REPLACEMENT (Right) without complications that morning.    During the postoperative period,while in hospital, patient was medically managed by the hospitalist. Please see medial notes and H&P for patients additional diagnoses.  Ortho agrees with all medical diagnoses and treatments while patient in hospital.  No significant or unexpected findings or abnormal ortho imaging were noted during the hospital stay    Hospital course  Patient tolerated surgical procedure well and there was no complications. Patient progressed adequtly through their recovery during hospital stay including PT and rehabilitation.  DVT prophylaxis was implemented POD#1  Patient was then D/C on  to Home  in stable condition.  Patient was instructed on the use of pain medications, the signs and symptoms of infection, and was given our number to call should they have any questions or concerns following discharge.

## 2024-12-17 NOTE — PROGRESS NOTES
Hospitalist Daily Progress Note  Name: Sukumar Clarke  Age: 64 y.o.  Gender: male  CodeStatus: Full Code  Allergies: No Known Allergies    Chief Complaint:No chief complaint on file.      Primary Care Provider: Leela Ron MD    InpatientTreatment Team: Treatment Team:   Rhys Lee MD Stanfield, William B, MD Campbell, Michael, MD Hipp, Lisa, RN Rodriguez, Amanda Smith, Camryn, RN Taylor, Shamura Perez, Jacquelyn A, Meghan Funes RN    Admission Date: 12/12/2024      Subjective: No chest pain, sob, nausea.  Pain improved.    Physical Exam  Vitals and nursing note reviewed.   Constitutional:       Appearance: Normal appearance.   Cardiovascular:      Rate and Rhythm: Normal rate and regular rhythm.   Pulmonary:      Effort: Pulmonary effort is normal.      Breath sounds: Normal breath sounds.   Abdominal:      General: Bowel sounds are normal.      Palpations: Abdomen is soft.   Skin:     General: Skin is warm and dry.   Neurological:      Mental Status: He is alert and oriented to person, place, and time. Mental status is at baseline.         Medications:  Reviewed    Infusion Medications:    sodium chloride Stopped (12/13/24 0936)    sodium chloride      sodium chloride 100 mL/hr at 12/12/24 2210    dextrose       Scheduled Medications:    sodium chloride flush  5-40 mL IntraVENous 2 times per day    sennosides-docusate sodium  1 tablet Oral BID    aspirin  81 mg Oral BID    hydrOXYzine HCl  10 mg Oral TID    insulin lispro  0-8 Units SubCUTAneous 4x Daily AC & HS    atorvastatin  40 mg Oral Daily    lisinopril  40 mg Oral Daily    spironolactone  50 mg Oral Daily    pantoprazole  40 mg Oral QAM AC     PRN Meds: oxyCODONE-acetaminophen, sodium chloride flush, sodium chloride, cyclobenzaprine, ondansetron **OR** ondansetron, aluminum & magnesium hydroxide-simethicone, glucose, dextrose bolus **OR** dextrose bolus, glucagon (rDNA), dextrose    Labs:   Recent Labs     12/13/24  2367 
  Hospitalist Daily Progress Note  Name: Sukumar Clarke  Age: 64 y.o.  Gender: male  CodeStatus: Full Code  Allergies: No Known Allergies    Chief Complaint:No chief complaint on file.      Primary Care Provider: Leela Ron MD    InpatientTreatment Team: Treatment Team:   Rhys Lee, Rhys Baird MD Campbell, Michael, MD Hipp, Lisa, RN Rodriguez, Vanessa Warren RN Taylor, Meghan Dowling RN Perez, Jacquelyn A [facility]    Admission Date: 12/12/2024      Subjective: No chest pain, sob, nausea.  Complaining of no bowel movement for several days.  Physical Exam  Vitals and nursing note reviewed.   Constitutional:       Appearance: Normal appearance.   Cardiovascular:      Rate and Rhythm: Normal rate and regular rhythm.   Pulmonary:      Effort: Pulmonary effort is normal.      Breath sounds: Normal breath sounds.   Abdominal:      General: Bowel sounds are normal.      Palpations: Abdomen is soft.   Skin:     General: Skin is warm and dry.   Neurological:      Mental Status: He is alert and oriented to person, place, and time. Mental status is at baseline.         Medications:  Reviewed    Infusion Medications:    sodium chloride Stopped (12/13/24 0936)    sodium chloride      sodium chloride 100 mL/hr at 12/12/24 2210    dextrose       Scheduled Medications:    sodium chloride flush  5-40 mL IntraVENous 2 times per day    sennosides-docusate sodium  1 tablet Oral BID    aspirin  81 mg Oral BID    hydrOXYzine HCl  10 mg Oral TID    insulin lispro  0-8 Units SubCUTAneous 4x Daily AC & HS    atorvastatin  40 mg Oral Daily    lisinopril  40 mg Oral Daily    spironolactone  50 mg Oral Daily    pantoprazole  40 mg Oral QAM AC     PRN Meds: oxyCODONE-acetaminophen, sodium chloride flush, sodium chloride, cyclobenzaprine, ondansetron **OR** ondansetron, aluminum & magnesium hydroxide-simethicone, glucose, dextrose bolus **OR** dextrose bolus, glucagon (rDNA), dextrose    Labs: 
  Hospitalist Daily Progress Note  Name: Sukumar Clarke  Age: 64 y.o.  Gender: male  CodeStatus: Full Code  Allergies: No Known Allergies    Chief Complaint:No chief complaint on file.      Primary Care Provider: Leela oRn MD    InpatientTreatment Team: Treatment Team:   Rhys Lee MD Stanfield, William B, MD Campbell, Michael, MD Cullom, Katherine, RN Diaz, Kimberly, RN Hipp, Lisa, Karon Padron    Admission Date: 12/12/2024      Subjective: No chest pain, sob, nausea.  Pain not well controlled.    Physical Exam  Vitals and nursing note reviewed.   Constitutional:       Appearance: Normal appearance.   Cardiovascular:      Rate and Rhythm: Normal rate and regular rhythm.   Pulmonary:      Effort: Pulmonary effort is normal.      Breath sounds: Normal breath sounds.   Abdominal:      General: Bowel sounds are normal.      Palpations: Abdomen is soft.   Skin:     General: Skin is warm and dry.   Neurological:      Mental Status: He is alert and oriented to person, place, and time. Mental status is at baseline.         Medications:  Reviewed    Infusion Medications:    sodium chloride Stopped (12/13/24 0936)    sodium chloride      sodium chloride 100 mL/hr at 12/12/24 2210    dextrose       Scheduled Medications:    sodium chloride flush  5-40 mL IntraVENous 2 times per day    acetaminophen  650 mg Oral Q6H    sennosides-docusate sodium  1 tablet Oral BID    aspirin  81 mg Oral BID    hydrOXYzine HCl  10 mg Oral TID    insulin lispro  0-8 Units SubCUTAneous 4x Daily AC & HS    atorvastatin  40 mg Oral Daily    lisinopril  40 mg Oral Daily    spironolactone  50 mg Oral Daily    pantoprazole  40 mg Oral QAM AC     PRN Meds: sodium chloride flush, sodium chloride, oxyCODONE **OR** oxyCODONE, cyclobenzaprine, ondansetron **OR** ondansetron, aluminum & magnesium hydroxide-simethicone, glucose, dextrose bolus **OR** dextrose bolus, glucagon (rDNA), dextrose    Labs:   Recent Labs     
1050-xray done of right knee  
CLINICAL PHARMACY NOTE: MEDS TO BEDS    Total # of Prescriptions Filled: 5   The following medications were delivered to the patient:  Cyclobenzaprine HCL 10 mg tab  Hydroxyzine HCL 10 mg tab  Aspirin 81 mg tab  Senna- S 8.6/50 mg tab   Oxycodone HCL 10 mg tab    Additional Documentation:  Patient declined the Oxycodone HCL 5 mg tab because he said it doesn't work for him. He said he would talk to the RN about getting it switched to something stronger.   
[facility]  Occupational Therapy        NAME:  Sukumar Clarke  ROOM: W273/W273-01  :  1960  DATE: 2024    Attempted to see Sukumar Clarke at 0940 on this date for:   []  Initial Evaluation   [x]  Treatment       Patient was unable to be seen due to:   [] Off unit for testing/procedure    [x] Patient politely declined, \"I haven't worked but I've been showering every day. I really don't need to wash up.\" Pt states he has no concerns for donning socks/pants since he is able to range his knee. Declines concerns for bathing and plans to d/c to SNF for rehab prior to d/c home.    Electronically signed by JOLENE Mcgowan on 24 at 9:46 AM EST  
Hospitalist Progress Note      PCP: Leela Ron MD    Date of Admission: 12/12/2024    Chief Complaint:    No chief complaint on file.      Subjective:  Patient denies fevers, chills, sweats, CP, SOB, diarrhea or burning micturition.  Has some constipation.  12 point ROS negative other than mentioned above       Medications:  Reviewed    Infusion Medications    sodium chloride Stopped (12/13/24 0936)    sodium chloride      sodium chloride 100 mL/hr at 12/12/24 2210    dextrose       Scheduled Medications    polyethylene glycol  17 g Oral BID    sodium chloride flush  5-40 mL IntraVENous 2 times per day    sennosides-docusate sodium  1 tablet Oral BID    aspirin  81 mg Oral BID    hydrOXYzine HCl  10 mg Oral TID    insulin lispro  0-8 Units SubCUTAneous 4x Daily AC & HS    atorvastatin  40 mg Oral Daily    lisinopril  40 mg Oral Daily    spironolactone  50 mg Oral Daily    pantoprazole  40 mg Oral QAM AC     PRN Meds: oxyCODONE-acetaminophen, sodium chloride flush, sodium chloride, cyclobenzaprine, ondansetron **OR** ondansetron, aluminum & magnesium hydroxide-simethicone, glucose, dextrose bolus **OR** dextrose bolus, glucagon (rDNA), dextrose    No intake or output data in the 24 hours ending 12/16/24 1254    Exam:    BP (!) 108/52   Pulse 65   Temp 98.1 °F (36.7 °C) (Oral)   Resp 18   Ht 1.78 m (5' 10.08\")   Wt (!) 136.5 kg (301 lb)   SpO2 97%   BMI 43.09 kg/m²     General appearance: No apparent distress, appears stated age and cooperative.  HEENT:  Conjunctivae/corneas clear.  Neck: Trachea midline.  Respiratory:  Normal respiratory effort. Clear to auscultation  Cardiovascular: Regular rate and rhythm  Abdomen: Soft, non-tender, non-distended with normal bowel sounds.  Musculoskeletal: No clubbing, cyanosis or edema bilaterally  Neuro: Non Focal.     Labs:   No results for input(s): \"WBC\", \"HGB\", \"HCT\", \"PLT\" in the last 72 hours.  No results for input(s): \"NA\", \"K\", \"CL\", \"CO2\", \"BUN\", 
Hospitalist Progress Note      PCP: Leela Ron MD    Date of Admission: 12/12/2024    Chief Complaint:    No chief complaint on file.    Subjective:  Patient denies fevers, chills, sweats, CP, SOB, diarrhea or burning micturition.  12 point ROS negative other than mentioned above     Medications:  Reviewed    Infusion Medications    sodium chloride      dextrose       Scheduled Medications    polyethylene glycol  17 g Oral BID    sodium chloride flush  5-40 mL IntraVENous 2 times per day    sennosides-docusate sodium  1 tablet Oral BID    aspirin  81 mg Oral BID    hydrOXYzine HCl  10 mg Oral TID    insulin lispro  0-8 Units SubCUTAneous 4x Daily AC & HS    atorvastatin  40 mg Oral Daily    lisinopril  40 mg Oral Daily    spironolactone  50 mg Oral Daily    pantoprazole  40 mg Oral QAM AC     PRN Meds: oxyCODONE-acetaminophen, sodium chloride flush, sodium chloride, cyclobenzaprine, ondansetron **OR** ondansetron, aluminum & magnesium hydroxide-simethicone, glucose, dextrose bolus **OR** dextrose bolus, glucagon (rDNA), dextrose    No intake or output data in the 24 hours ending 12/17/24 1509    Exam:    BP (!) 91/53   Pulse 72   Temp 97.9 °F (36.6 °C)   Resp 18   Ht 1.78 m (5' 10.08\")   Wt (!) 136.5 kg (301 lb)   SpO2 98%   BMI 43.09 kg/m²     General appearance: No apparent distress, appears stated age and cooperative.  HEENT:  Conjunctivae/corneas clear.  Neck: Trachea midline.  Respiratory:  Normal respiratory effort. Clear to auscultation  Cardiovascular: Regular rate and rhythm  Abdomen: Soft, non-tender, non-distended with normal bowel sounds.  Musculoskeletal: No clubbing, cyanosis or edema bilaterally  Neuro: Non Focal.     Labs:   No results for input(s): \"WBC\", \"HGB\", \"HCT\", \"PLT\" in the last 72 hours.  No results for input(s): \"NA\", \"K\", \"CL\", \"CO2\", \"BUN\", \"CREATININE\", \"CALCIUM\", \"PHOS\" in the last 72 hours.    Invalid input(s): \"MAGNES\"  No results for input(s): \"AST\", \"ALT\", \"BILIDIR\", 
Physical Therapy Med Surg Daily Treatment Note  Facility/Department: [facility] [place] 2W ORTHO TELE  [facility]       NAME: Sukumar Clarke  : 1960 (64 y.o.)  MRN: [id number]  CODE STATUS: Full Code    Date of Service: 2024    Patient Diagnosis(es): Primary osteoarthritis of right knee [M17.11]  Status post total knee replacement, right [Z96.651]   No chief complaint on file.    Patient Active Problem List    Diagnosis Date Noted    Status post total knee replacement, right 2024        Past Medical History:   Diagnosis Date    Diabetes mellitus (HCC)     Hyperlipidemia     Hypertension      Past Surgical History:   Procedure Laterality Date    COLONOSCOPY  2022    KNEE ARTHROSCOPY Right     PILONIDAL CYST EXCISION      TOTAL KNEE ARTHROPLASTY Right 2024    RIGHT KNEE TOTAL KNEE REPLACEMENT performed by Rhys Lee MD at [facility] OR       Chart Reviewed: Yes  Family/Caregiver Present: No  Diagnosis: Status post total knee replacement, right     Restrictions:  Restrictions/Precautions: Fall Risk;Weight Bearing  Lower Extremity Weight Bearing Restrictions  Right Lower Extremity Weight Bearing: Weight Bearing As Tolerated  Position Activity Restriction  Other Position/Activity Restrictions: immobilizer donned until +SLR (+SLR noted )    SUBJECTIVE:   Subjective: Patient resting in bed. Agreeable to tx.  Response To Previous Treatment: Patient with no complaints from previous session.    Pain  Patient denies pain pre/post session   OBJECTIVE:     Bed mobility  Supine to Sit: Stand by assistance  Sit to Supine: Stand by assistance  Scooting: Stand by assistance  Bed Mobility Comments: HOB flat; patient able to lift R LE in/out of bed    Transfers  Sit to Stand: Stand by assistance  Stand to Sit: Stand by assistance  Comment: Patient requires increased time and effort    Ambulation  Surface: Level tile  Device: Rolling Walker  Assistance: Stand by assistance   Quality of Gait: flexed 
Physical Therapy Med Surg Daily Treatment Note  [place] [facility] 2W [place]THO TELE  Room: 73/W273-       NAME: Sukumar Clarke  : 1960 (64 y.o.)  MRN: [id number]  CODE STATUS: Full Code    Date of Service: 12/15/2024    Patient Diagnosis(es): Primary osteoarthritis of right knee [M17.11]  Status post total knee replacement, right [Z96.651]   No chief complaint on file.    Patient Active Problem List    Diagnosis Date Noted    Status post total knee replacement, right 2024        Past Medical History:   Diagnosis Date    Diabetes mellitus (HCC)     Hyperlipidemia     Hypertension      Past Surgical History:   Procedure Laterality Date    COLONOSCOPY  2022    KNEE ARTHROSCOPY Right     PILONIDAL CYST EXCISION      TOTAL KNEE ARTHROPLASTY Right 2024    RIGHT KNEE TOTAL KNEE REPLACEMENT performed by Rhys Lee MD at [facility] [place]       Chart Reviewed: Yes    Restrictions:  Restrictions/Precautions: Fall Risk;Weight Bearing  Lower Extremity Weight Bearing Restrictions  Right Lower Extremity Weight Bearing: Weight Bearing As Tolerated  Position Activity Restriction  Other Position/Activity Restrictions: immobilizer donned until +SLR (+SLR noted )    SUBJECTIVE:   Subjective: \"I'm doing okay\"    Pain   5/10 LT knee, nurse notified.     OBJECTIVE:        Bed mobility  Supine to Sit: Stand by assistance  Sit to Supine: Stand by assistance  Bed Mobility Comments: Increased time and effort, use of HR with HOB slightly elevated as patient has adjustable bed at home    Transfers  Sit to Stand: Stand by assistance  Stand to Sit: Stand by assistance;Minimal Assistance  Comment: Min A to lower surfaced commode    Ambulation  Surface: Level tile  Device: Rolling Walker  Assistance: Minimal assistance  Quality of Gait: Improving fluidity, able to progress step through gait, flexed posture  Distance: 40ft x2                   PT Exercises  Exercise Treatment: Supine: AAROM SLR x5  A/AROM 
Physical Therapy Med Surg Daily Treatment Note  Facility/Department: [facility] [place] 2W ORTHO TELE  [facility]       NAME: Sukumar Clarke  : 1960 (64 y.o.)  MRN: [id number]  CODE STATUS: Full Code    Date of Service: 2024    Patient Diagnosis(es): Primary osteoarthritis of right knee [M17.11]  Status post total knee replacement, right [Z96.651]   No chief complaint on file.    Patient Active Problem List    Diagnosis Date Noted    Status post total knee replacement, right 2024        Past Medical History:   Diagnosis Date    Diabetes mellitus (HCC)     Hyperlipidemia     Hypertension      Past Surgical History:   Procedure Laterality Date    COLONOSCOPY  2022    KNEE ARTHROSCOPY Right     PILONIDAL CYST EXCISION      TOTAL KNEE ARTHROPLASTY Right 2024    RIGHT KNEE TOTAL KNEE REPLACEMENT performed by Rhys Lee MD at [facility] [place] OR       Chart Reviewed: Yes  Family/Caregiver Present: No  Diagnosis: Status post total knee replacement, right     Restrictions:  Restrictions/Precautions: Fall Risk;Weight Bearing  Lower Extremity Weight Bearing Restrictions  Right Lower Extremity Weight Bearing: Weight Bearing As Tolerated  Position Activity Restriction  Other Position/Activity Restrictions: immobilizer donned until +SLR (+SLR noted )    SUBJECTIVE:   Subjective: Patient resting in bed. Agreeable to tx.  Response To Previous Treatment: Patient with no complaints from previous session.    Pain  6/10 right knee pre/post session     OBJECTIVE:     Bed mobility  Supine to Sit: Stand by assistance  Sit to Supine: Stand by assistance  Scooting: Stand by assistance  Bed Mobility Comments: HOB flat; patient able to lift R LE in/out of bed    Transfers  Sit to Stand: Stand by assistance; moderate assist- to stand up from toilet   Stand to Sit: Stand by assistance  Comment: Patient requires increased time and effort.     Ambulation  Surface: Level tile  Device: Rolling Walker  Assistance: 
Physical Therapy Med Surg Daily Treatment Note  [place] [facility] [place]  Room: 73/W273-01       NAME: Sukumar Clarke  : 1960 (64 y.o.)  MRN: [id number]  CODE STATUS: Full Code    Date of Service: 2024    Patient Diagnosis(es): Primary osteoarthritis of right knee [M17.11]  Status post total knee replacement, right [Z96.651]   No chief complaint on file.    Patient Active Problem List    Diagnosis Date Noted    Status post total knee replacement, right 2024        Past Medical History:   Diagnosis Date    Diabetes mellitus (HCC)     Hyperlipidemia     Hypertension      Past Surgical History:   Procedure Laterality Date    COLONOSCOPY  2022    KNEE ARTHROSCOPY Right     PILONIDAL CYST EXCISION      TOTAL KNEE ARTHROPLASTY Right 2024    RIGHT KNEE TOTAL KNEE REPLACEMENT performed by Rhys Lee [place] [facility] [place]       Chart Reviewed: Yes  Family/Caregiver Present: No    Restrictions:  Restrictions/Precautions: Fall Risk;Weight Bearing  Lower Extremity Weight Bearing Restrictions  Right Lower Extremity Weight Bearing: Weight Bearing As Tolerated  Position Activity Restriction  Other Position/Activity Restrictions: immobilizer donned until +SLR (+SLR noted )    SUBJECTIVE:   Subjective: pt reports desire to return to bed d/t pain while sitting in bedside chair.    Pain  Pain  Pre-Pain: 10  Post-Pain: 10  Pain Location: Right;Knee  Pain Interventions: Nurse notified (pt declines ice, reports he was medicated prior to session.)    OBJECTIVE:        Bed mobility  Sit to Supine: Stand by assistance  Bed Mobility Comments: pt able to raise RLE into bed without assistance. pt adamant to have bed at elevated height despite education and encouragement to complete from standard height.    Transfers  Sit to Stand: Minimal Assistance  Stand to Sit: Stand by assistance  Bed to Chair: Minimal assistance  Comment: Min A for stabilizing once standing, pt demos good carryover of 
Physical Therapy Med Surg Daily Treatment Note  Facility/Department: [facility] [place] 2W ORTHO TELE  Room: W273/W273-01       NAME: Sukumar Clarke  : 1960 (64 y.o.)  MRN: [id number]  CODE STATUS: Full Code    Date of Service: 2024    Patient Diagnosis(es): Primary osteoarthritis of right knee [M17.11]  Status post total knee replacement, right [Z96.651]   No chief complaint on file.    Patient Active Problem List    Diagnosis Date Noted    Status post total knee replacement, right 2024        Past Medical History:   Diagnosis Date    Diabetes mellitus (HCC)     Hyperlipidemia     Hypertension      Past Surgical History:   Procedure Laterality Date    COLONOSCOPY  2022    KNEE ARTHROSCOPY Right     PILONIDAL CYST EXCISION      TOTAL KNEE ARTHROPLASTY Right 2024    RIGHT KNEE TOTAL KNEE REPLACEMENT performed by Rhys Lee MD at [facility] OR       Chart Reviewed: Yes  Family/Caregiver Present: No    Restrictions:  Restrictions/Precautions: Fall Risk;Weight Bearing  Lower Extremity Weight Bearing Restrictions  Right Lower Extremity Weight Bearing: Weight Bearing As Tolerated  Position Activity Restriction  Other Position/Activity Restrictions: immobilizer donned until +SLR (+SLR noted )    SUBJECTIVE:   Subjective: \"I am doing better today.\"    Pain  Pain  Pre-Pain: 7  Post-Pain: 7  Pain Location: Right;Knee  Pain Interventions: Ice;Repositioning    OBJECTIVE:        Bed mobility  Supine to Sit: Stand by assistance  Sit to Supine: Stand by assistance (vc's for hooking RLE with LLE to assist with lifting into bed, good carryover and ability.)    Transfers  Sit to Stand: Minimal Assistance  Stand to Sit: Stand by assistance  Comment: Min A for stabilizing once standing, pt demos good carryover of cues for sequencing and hand placement.    Ambulation  Surface: Level tile  Device: Rolling Walker  Assistance: Minimal assistance  Quality of Gait: step to progressing to step through pattern. increased 
Physical Therapy Med Surg Daily Treatment Note  [place] [facility] [place] 2[facility] [place]O [place]  [facility]       NAME: Sukumar Clarke  : 1960 (64 y.o.)  MRN: [id number]  CODE STATUS: Full Code    Date of Service: 2024    Patient Diagnosis(es): Primary osteoarthritis of right knee [M17.11]  Status post total knee replacement, right [Z96.651]   No chief complaint on file.    Patient Active Problem List    Diagnosis Date Noted    Status post total knee replacement, right 2024        Past Medical History:   Diagnosis Date    Diabetes mellitus (HCC)     Hyperlipidemia     Hypertension      Past Surgical History:   Procedure Laterality Date    COLONOSCOPY  2022    KNEE ARTHROSCOPY Right     PILONIDAL CYST EXCISION      TOTAL KNEE ARTHROPLASTY Right 2024    RIGHT KNEE TOTAL KNEE REPLACEMENT performed by Rhys Lee MD at [facility] [place]       Chart Reviewed: Yes    Restrictions:  Restrictions/Precautions: Fall Risk;Weight Bearing  Lower Extremity Weight Bearing Restrictions  Right Lower Extremity Weight Bearing: Weight Bearing As Tolerated  Position Activity Restriction  Other Position/Activity Restrictions: immobilizer donned until +SLR (+SLR noted )    SUBJECTIVE:   Subjective: \"I am going home.\"    Pain  Pain  Pre-Pain: 3  Post-Pain: 3  Pain Location: Right;Knee  Pain Interventions: Ice;Repositioning    OBJECTIVE:        Bed mobility  Supine to Sit: Supervision  Sit to Supine: Supervision  Bed Mobility Comments: HOB flat; patient able to lift R LE in/out of bed    Transfers  Sit to Stand: Stand by assistance  Stand to Sit: Stand by assistance  Comment: Patient requires increased time and effort    Ambulation  Surface: Level tile  Device: Rolling Walker  Assistance: Stand by assistance  Quality of Gait: flexed posture, step to antalgic gait, relies heavily on UE support, standing rest breaks taken.  Distance: 150'  Comments: cues for reciprocal pattern and forward gaze.         Activity 
Physical Therapy Med Surg Daily Treatment Note  [place] [facility] [place] 2[place]  Room: 73/W273-01       NAME: Sukumar Clarke  : 1960 (64 y.o.)  MRN: [id number]  CODE STATUS: Full Code    Date of Service: 2024    Patient Diagnosis(es): Primary osteoarthritis of right knee [M17.11]  Status post total knee replacement, right [Z96.651]   No chief complaint on file.    Patient Active Problem List    Diagnosis Date Noted    Status post total knee replacement, right 2024        Past Medical History:   Diagnosis Date    Diabetes mellitus (HCC)     Hyperlipidemia     Hypertension      Past Surgical History:   Procedure Laterality Date    COLONOSCOPY  2022    KNEE ARTHROSCOPY Right     PILONIDAL CYST EXCISION      TOTAL KNEE ARTHROPLASTY Right 2024    RIGHT KNEE TOTAL KNEE REPLACEMENT performed by Rhys Lee MD at [facility] [place]       Chart Reviewed: Yes  Family/Caregiver Present: No    Restrictions:  Restrictions/Precautions: Fall Risk;Weight Bearing  Lower Extremity Weight Bearing Restrictions  Right Lower Extremity Weight Bearing: Weight Bearing As Tolerated  Position Activity Restriction  Other Position/Activity Restrictions: immobilizer donned until +SLR (+SLR noted )    SUBJECTIVE:   Subjective: \"I am doing better today.\"    Pain  Pain  Pre-Pain: 7  Post-Pain: 7  Pain Location: Right;Knee  Pain Interventions: Ice;Repositioning    OBJECTIVE:        Bed mobility  Supine to Sit: Stand by assistance  Sit to Supine: Stand by assistance (vc's for hooking RLE with LLE to assist with lifting into bed, good carryover and ability.)    Transfers  Sit to Stand: Minimal Assistance  Stand to Sit: Stand by assistance  Comment: Min A for stabilizing once standing, pt demos good carryover of cues for sequencing and hand placement.    Ambulation  Surface: Level tile  Device: Rolling Walker  Assistance: Minimal assistance  Quality of Gait: step to pattern. increased bracing on WW during R 
Progress Note   TKA    Subjective:     Post-Operative Day: 1 Status Post right Total Knee Arthroplasty  Systemic or Specific Complaints:Pain Control    Objective:     CURRENT VITALS:  /70   Pulse 71   Temp 97.9 °F (36.6 °C)   Resp 18   Ht 1.78 m (5' 10.08\")   Wt (!) 136.5 kg (301 lb)   SpO2 93%   BMI 43.09 kg/m²     General: alert, appears stated age, and cooperative   Wound: Wound clean and dry no evidence of infection.   Motion: Painful range of Motion   DVT Exam: No evidence of DVT seen on physical exam.       Knee swollen but thigh soft to palpation. Moving foot and ankle. Good distal pulses.      Data Review  Recent Labs     12/13/24  0459   WBC 8.6   RBC 3.45*   HGB 11.3*   HCT 32.1*   MCV 93.0*   MCH 32.8*   MCHC 35.2   RDW 13.1            Assessment:     Status Post right Total Knee Arthroplasty. Doing well postoperatively. Pt is morbidly obese- he is moving but requires assistance   He is having some pain control issues, we spoke with him about taking pain meds around the clock. Pt does live home alone he denies any CP/ SOB or dizziness   He did have cardiac workup prior to surgery with [facility]   Pt is able to void  Plan for SNF- precert was started    Plan:      1: Continues current post-op course- if precert obtained- ok to d/c  to SNF/ rx for pain in chart and when medically ok  :  2:  Continue Deep venous thrombosis prophylaxis  3:  Continue physical therapy  4:  Continue Pain Control      
Progress Note   TKA    Subjective:     Post-Operative Day: 2 Status Post right Total Knee Arthroplasty  Systemic or Specific Complaints:Pain Control    Objective:     CURRENT VITALS:  BP (!) 110/57   Pulse 84   Temp 98.6 °F (37 °C) (Oral)   Resp 18   Ht 1.78 m (5' 10.08\")   Wt (!) 136.5 kg (301 lb)   SpO2 92%   BMI 43.09 kg/m²     General: alert, appears stated age, and cooperative   Wound: Wound clean and dry no evidence of infection.   Motion: Painful range of Motion   DVT Exam: No evidence of DVT seen on physical exam.       Knee swollen but thigh soft to palpation. Moving foot and ankle. Good distal pulses.      Data Review  Recent Labs     12/13/24  0459   WBC 8.6   RBC 3.45*   HGB 11.3*   HCT 32.1*   MCV 93.0*   MCH 32.8*   MCHC 35.2   RDW 13.1            Assessment:     Status Post right Total Knee Arthroplasty. Doing well postoperatively. Pt is morbidly obese- he is moving but requires assistancePlan for SNF- precert was started    Plan:      1: Continues current post-op course- if precert obtained- ok to d/c  to SNF/ rx for pain in chart and when medically ok  :  2:  Continue Deep venous thrombosis prophylaxis  3:  Continue physical therapy  4:  Continue Pain Control      
Progress Note   TKA    Subjective:     Post-Operative Day: 3 Status Post right Total Knee Arthroplasty  Systemic or Specific Complaints:Pain Control    Objective:     CURRENT VITALS:  BP (!) 103/59   Pulse 91   Temp 97.9 °F (36.6 °C) (Oral)   Resp 18   Ht 1.78 m (5' 10.08\")   Wt (!) 136.5 kg (301 lb)   SpO2 97%   BMI 43.09 kg/m²     General: alert, appears stated age, and cooperative   Wound: Wound clean and dry no evidence of infection.   Motion: Painful range of Motion   DVT Exam: No evidence of DVT seen on physical exam.       Knee swollen but thigh soft to palpation. Moving foot and ankle. Good distal pulses.      Data Review  Recent Labs     12/13/24  0459   WBC 8.6   RBC 3.45*   HGB 11.3*   HCT 32.1*   MCV 93.0*   MCH 32.8*   MCHC 35.2   RDW 13.1            Assessment:     Status Post right Total Knee Arthroplasty. Doing well postoperatively. Pt is morbidly obese- he is moving but requires assistancePlan for SNF- precert was started    Plan:      1: Continues current post-op course- if precert obtained- ok to d/c  to SNF/ rx for pain in chart and when medically ok  :  2:  Continue Deep venous thrombosis prophylaxis  3:  Continue physical therapy  4:  Continue Pain Control      
Progress Note   TKA    Subjective:     Post-Operative Day: 5 Status Post right Total Knee Arthroplasty  Systemic or Specific Complaints:No Complaints/ some itching of the leg    Objective:     CURRENT VITALS:  /73   Pulse 58   Temp 97.7 °F (36.5 °C) (Oral)   Resp 18   Ht 1.78 m (5' 10.08\")   Wt (!) 136.5 kg (301 lb)   SpO2 100%   BMI 43.09 kg/m²     General: alert, appears stated age, and cooperative   Wound: Wound clean and dry no evidence of infection. Mepiles intact   Motion: Painful range of Motion   DVT Exam: No evidence of DVT seen on physical exam.       Knee swollen but thigh soft to palpation. Moving foot and ankle. Good distal pulses.      Data Review  No results for input(s): \"WBC\", \"RBC\", \"HGB\", \"HCT\", \"MCV\", \"MCH\", \"MCHC\", \"RDW\", \"PLT\", \"MPV\" in the last 72 hours.      Assessment:     Status Post right Total Knee Arthroplasty. Doing well postoperatively. Pt is having a reaction to the soap   He states he was given soap to wash with here and yesterday his leg started itching this am it has a itchy red raised rash   We spoke about mild soap and water - he states he uses dove as he can't handle much other soap    He is already on atarax- which we will send to pharmacy   We will give him a dose of steroid while here   Also  pt is POD5 he is moving well    We spoke about going home instead of going to skilled facility at this time since he is meeting goals with therapy- pt is agreeable   We sent his meds to therapy     Plan:      1: Continues current post-op course :  2:  Continue Deep venous thrombosis prophylaxis  3:  Continue physical therapy  4:  Continue Pain Control      
Pt ambulated to bathroom with walker, urinated and returned to bed. No issue noted, pt tolerated well. Call light in reach, care ongoing  
AAROM x5           ASSESSMENT   Body Structures, Functions, Activity Limitations Requiring Skilled Therapeutic Intervention: Decreased functional mobility ;Decreased ADL status;Decreased ROM;Decreased body mechanics;Decreased strength;Decreased balance;Decreased endurance  Assessment: Patien requires AAROM to complete open chain exercises. Completes bed mobility with SBA however Min A to uncross LEs once in bed. VCs to improve posture and increase WBing through LEs as tolerated.     Discharge Recommendations:  Continue to assess pending progress, Therapy recommended at discharge         Goals  Long Term Goals  Long Term Goal 1: Pt to complete bed mobility with indep  Long Term Goal 2: Pt to complete transfers with indep  Long Term Goal 3: Pt to ambulate 50-150ft with LRD and indep  Long Term Goal 4: Pt to complete HEP with indep    PLAN    General Plan: 1 time a day 3-6 times a week  Safety Devices  Type of Devices: Bed alarm in place, Call light within reach, Left in bed     AMPAC (6 CLICK) BASIC MOBILITY  AM-PAC Inpatient Mobility Raw Score : 17     Therapy Time   Individual   Time In 0958   Time Out 1021   Minutes 23     Timed Code Treatment Minutes: 23 Minutes     Gait 10  There ex 13  Jennifer Fallon PTA, 12/15/24 at 10:28 AM     Electronically signed by Jennifer Fallon PTA on 12/15/2024 at 10:29 AM      Definitions for assistance levels  Independent = pt does not require any physical supervision or assistance from another person for activity completion. Device may be needed.  Stand by assistance = pt requires verbal cues or instructions from another person, close to but not touching, to perform the activity  Minimal assistance= pt performs 75% or more of the activity; assistance is required to complete the activity  Moderate assistance= pt performs 50% of the activity; assistance is required to complete the activity  Maximal assistance = pt performs 25% of the activity; assistance is required to complete 
assistance  Quality of Gait: step to pattern. increased bracing on WW during R stance phase. poor WW approximation, cues to correct  Distance: 15'                   PT Exercises  Exercise Treatment: pt given written HEP for supine/seated therex, instructed on technique dosage and frequency of all exercises, pt verbalizes understanding.  A/AROM Exercises: supine AP/QS/GS/SLR/Hip abd/HS x10 RLE.          Activity Tolerance  Activity Tolerance: Patient tolerated treatment well          ASSESSMENT   Assessment: pt able to complete SLR this session, limited by 9/10 pain, able to increase gait distance compared to eval.     Discharge Recommendations:  Continue to assess pending progress, Therapy recommended at discharge         Goals  Long Term Goals  Long Term Goal 1: Pt to complete bed mobility with indep  Long Term Goal 2: Pt to complete transfers with indep  Long Term Goal 3: Pt to ambulate 50-150ft with LRD and indep  Long Term Goal 4: Pt to complete HEP with indep    PLAN    General Plan: 1 time a day 3-6 times a week  Safety Devices  Type of Devices: Bed alarm in place, Call light within reach, Left in bed     AMPAC (6 CLICK) BASIC MOBILITY  AM-PAC Inpatient Mobility Raw Score : 17      Therapy Time   Individual   Time In 0830   Time Out 0853   Minutes 23      Gait: 8  Therex: 12  BM/trsf: 3        Aron Evans PTA, 12/13/24 at 9:51 AM         Definitions for assistance levels  Independent = pt does not require any physical supervision or assistance from another person for activity completion. Device may be needed.  Stand by assistance = pt requires verbal cues or instructions from another person, close to but not touching, to perform the activity  Minimal assistance= pt performs 75% or more of the activity; assistance is required to complete the activity  Moderate assistance= pt performs 50% of the activity; assistance is required to complete the activity  Maximal assistance = pt performs 25% of the activity; 
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    OBJECTIVE:   Vision  Vision: Impaired  Vision Exceptions: Wears glasses at all times  Hearing: Within functional limits    Cognition:  Overall Orientation Status: Within Functional Limits  Follows Commands: Within Functional Limits  Overall Cognitive Status: WFL    Observation/Palpation  Observation: Pt alert and attentive, no acute distress, knee immobilizer adjusted for fit    ROM:  AROM: Within functional limits  PROM: Within functional limits (R knee not formally assessed POD0)    Strength:  Strength: Generally decreased, functional    Neuro:  Balance  Sitting - Static: Good  Sitting - Dynamic: Good  Standing - Static: Fair;+  Standing - Dynamic: Fair;+                      Tone: Normal  Coordination: Within functional limits    Sensation: Intact    Bed mobility  Rolling to Right: Stand by assistance  Supine to Sit: Stand by assistance  Bed Mobility Comments: Increased time to complete    Transfers  Sit to Stand: Stand by assistance  Stand to Sit: Stand by assistance  Bed to Chair: Stand by assistance    Ambulation  Surface: Level tile  Device: Rolling Walker  Assistance: Stand by assistance  Quality of Gait: step to pattern. increased bracing on WW during R stance phase.  Distance: 4ft  Comments: further distance limited d/t decreased R LE sensation return                   Activity Tolerance  Activity Tolerance: Patient tolerated treatment well    Patient Education  Education Given To: Patient  Education Provided: Role of Therapy;Plan of Care  Education Method: Verbal  Education Outcome: Verbalized understanding;Continued education needed       ASSESSMENT:   Body Structures, Functions, Activity Limitations Requiring Skilled Therapeutic Intervention: Decreased functional mobility ;Decreased ADL 
Verbal  Barriers to Learning: None  Education Outcome: Verbalized understanding    Equipment recommendations:  Continue to assess pending progress.     Goals/Plan of care addressed during this session:  Patient Goal: Patient goals : \"I'd like to get home\"  Improve [place] with ADLs and Improve [place] with Functional Transfers    Therapy Time:   Individual Group Co-Treat   Time In 1053       Time Out 1101         Minutes 8         ADL/IADL trainin minutes    Electronically signed by:    STEFANIE Coleman    2024, 11:13 AM   
  Discussed and agreed upon: Yes Comments:       Therapy Time:   Individual   Time In 1432   Time Out 1444   Minutes 12     Eval: 12 minutes     Electronically signed by:    VENESSA Mcgowan/LENIN,   12/12/2024, 3:42 PM

## 2024-12-31 ENCOUNTER — APPOINTMENT (OUTPATIENT)
Dept: ORTHOPEDIC SURGERY | Facility: CLINIC | Age: 64
End: 2024-12-31
Payer: COMMERCIAL

## 2025-01-02 ENCOUNTER — APPOINTMENT (OUTPATIENT)
Dept: ORTHOPEDIC SURGERY | Facility: CLINIC | Age: 65
End: 2025-01-02
Payer: COMMERCIAL

## 2025-01-02 ENCOUNTER — HOSPITAL ENCOUNTER (OUTPATIENT)
Dept: RADIOLOGY | Facility: HOSPITAL | Age: 65
Discharge: HOME | End: 2025-01-02
Payer: COMMERCIAL

## 2025-01-02 ENCOUNTER — LAB (OUTPATIENT)
Dept: LAB | Facility: LAB | Age: 65
End: 2025-01-02
Payer: COMMERCIAL

## 2025-01-02 DIAGNOSIS — Z47.1 AFTERCARE FOLLOWING RIGHT KNEE JOINT REPLACEMENT SURGERY: Primary | ICD-10-CM

## 2025-01-02 DIAGNOSIS — Z47.1 AFTERCARE FOLLOWING RIGHT KNEE JOINT REPLACEMENT SURGERY: ICD-10-CM

## 2025-01-02 DIAGNOSIS — Z96.651 AFTERCARE FOLLOWING RIGHT KNEE JOINT REPLACEMENT SURGERY: Primary | ICD-10-CM

## 2025-01-02 DIAGNOSIS — Z96.651 AFTERCARE FOLLOWING RIGHT KNEE JOINT REPLACEMENT SURGERY: ICD-10-CM

## 2025-01-02 DIAGNOSIS — N41.0 PROSTATITIS, ACUTE: ICD-10-CM

## 2025-01-02 LAB
APPEARANCE UR: ABNORMAL
BACTERIA #/AREA URNS AUTO: ABNORMAL /HPF
BILIRUB UR STRIP.AUTO-MCNC: NEGATIVE MG/DL
COLOR UR: YELLOW
GLUCOSE UR STRIP.AUTO-MCNC: NORMAL MG/DL
HYALINE CASTS #/AREA URNS AUTO: ABNORMAL /LPF
KETONES UR STRIP.AUTO-MCNC: NEGATIVE MG/DL
LEUKOCYTE ESTERASE UR QL STRIP.AUTO: ABNORMAL
MUCOUS THREADS #/AREA URNS AUTO: ABNORMAL /LPF
NITRITE UR QL STRIP.AUTO: NEGATIVE
PH UR STRIP.AUTO: 6 [PH]
PROT UR STRIP.AUTO-MCNC: ABNORMAL MG/DL
RBC # UR STRIP.AUTO: NEGATIVE /UL
RBC #/AREA URNS AUTO: ABNORMAL /HPF
SP GR UR STRIP.AUTO: 1.04
SQUAMOUS #/AREA URNS AUTO: ABNORMAL /HPF
UROBILINOGEN UR STRIP.AUTO-MCNC: NORMAL MG/DL
WBC #/AREA URNS AUTO: >50 /HPF

## 2025-01-02 PROCEDURE — 73562 X-RAY EXAM OF KNEE 3: CPT | Mod: RT

## 2025-01-02 PROCEDURE — 73562 X-RAY EXAM OF KNEE 3: CPT | Mod: RIGHT SIDE | Performed by: RADIOLOGY

## 2025-01-02 PROCEDURE — 81001 URINALYSIS AUTO W/SCOPE: CPT

## 2025-01-02 PROCEDURE — 87086 URINE CULTURE/COLONY COUNT: CPT

## 2025-01-02 RX ORDER — CYCLOBENZAPRINE HCL 10 MG
10 TABLET ORAL NIGHTLY PRN
Qty: 30 TABLET | Refills: 0 | Status: SHIPPED | OUTPATIENT
Start: 2025-01-02

## 2025-01-03 LAB — HOLD SPECIMEN: NORMAL

## 2025-01-03 NOTE — PROGRESS NOTES
Subjective    Patient ID: Pedro    Chief Complaint:   Chief Complaint   Patient presents with    Right Knee - Post-op Visit     RT TKR 12/12/24, 3 weeks out, with x-rays today     History of present illness    64-year-old gentleman presented clinic today for his first postop follow-up visit status post right total knee replacement performed 12/12/2024.  Surgery was done at the [facility].  He is ambulating without assistance at this time.  Doing well.  He has done taking his oxycodone for pain relief and is wondering if he can go back on his tramadol which was given to him by his pain management physician.  Still has mild stiffness and spasms specially with sitting to standing.  Mild numbness over the anterior lateral aspect of the right knee.  No instability reported.  Sleeping much better at this time.      Past medical , Surgical, Family and social history reviewed.      Physical exam  General: No acute distress and breathing comfortably.  Patient is pleasant and cooperative with the examination.    Extremity  Right knee is neurovascular intact.  Range of motion 0 to 105 degrees.  Mild edema of the right knee.  Mild tightness over the lateral aspect of the right knee.  Incision is well-healed well-approximated without abnormality or infection.  Compartments are soft.  Capillary refill within normal is distally.  No sign of DVT.  No ecchymosis present.    Diagnostics  Please see dictated x-ray report    Procedure  [ none]    Assessment  Status post right total knee replaced    Treatment plan  1.  He was encouraged to continue with weightbearing activity as tolerated.  2.  Prescription for outpatient therapy given.  He will most likely do this at our [place] location.  Prescription refill cyclobenzaprine sent to the pharmacy to help with spasms mostly at night.  3.  He will follow-up with us in 3 weeks for reevaluation without x-ray.  4.  All of the patient's questions were answered.    Orders Placed This Encounter     XR knee right 3 views    Referral to Physical Therapy    cyclobenzaprine (Flexeril) 10 mg tablet       This note was prepared using voice recognition software.  The details of this note are correct and have been reviewed, and corrected to the best of my ability.  Some grammatical areas may persist related to the Dragon software    Hany Foreman PA-C, [facility]  [facility] [place]    [phone]

## 2025-01-04 LAB — BACTERIA UR CULT: NORMAL

## 2025-01-08 ENCOUNTER — HOSPITAL ENCOUNTER (OUTPATIENT)
Dept: PHYSICAL THERAPY | Age: 65
Setting detail: THERAPIES SERIES
Discharge: HOME | End: 2025-01-08
Payer: COMMERCIAL

## 2025-01-08 PROCEDURE — 97110 THERAPEUTIC EXERCISES: CPT

## 2025-01-08 PROCEDURE — 97161 PT EVAL LOW COMPLEX 20 MIN: CPT

## 2025-01-08 NOTE — PLAN OF CARE
Physical Therapy Evaluation/Plan of Care   [facility] [place] [facility] PT  [address]  [place] [address]  Dept: [phone]  Dept Fax: [phone]  Loc: [phone]    Physical Therapy: Initial Evaluation    General Information    Patient: Sukumar Clarke (64 y.o.     male)   Examination Date: 2025   :  1960 ;    Confirmed: Yes MRN: [id number]  CSN: [id number]   Insurance: Payor: [employer] [place] / Plan: [employer] [facility] / Product Type: Indemnity /   Insurance ID: [id number] - ([facility])    Secondary Insurance (if applicable):     Referring Physician: Frantz Mock PA       Visits to Date/Visits Approved: 1 / 15    No Show/Cancelled Appts: 0 / 0     Medical Diagnosis: Aftercare following joint replacement surgery [Z47.1]  Presence of right artificial knee joint [Z96.651]        Treatment Diagnosis: R knee pain, decreased R knee ROM and strength, impaired gait and mobility s/p TKA      SUBJECTIVE:     Onset date:   Onset Date: 24    Subjective/ Mechanism of Injury: Pt s/p R TKA 24 by Dr. Lee. Pt reports he completed [facility] PT. Pt says he goes to pain mgmt for both knees. Pt reports he plans to have L TKA in the future. Pt states he uses a cane in his condo but presents today with standard walker. Pt did drive himself to today's appt.        Precautions/Contraindications/Restrictions: recent surgery, falls risk                Interventions for current problem: Home PT, medications , surgery      Pain:   Current: 2/10   Best: 0/10   Worst:4/10     Symptom Type / Quality: aching  Location:: R knee    Imaging results (If Applicable): XR KNEE RIGHT (3 VIEWS)    Result Date: 2025  Interpreted By:  Seema Lea, STUDY: Right knee, three views    INDICATION: Signs/Symptoms:s/p TKR    COMPARISON: 2023.    ACCESSION NUMBER(S): [id number]    ORDERING CLINICIAN: KEARA LEE    FINDINGS: No acute fracture or malalignment. Status post

## 2025-01-14 ENCOUNTER — HOSPITAL ENCOUNTER (OUTPATIENT)
Dept: PHYSICAL THERAPY | Age: 65
Setting detail: THERAPIES SERIES
Discharge: HOME | End: 2025-01-14
Payer: COMMERCIAL

## 2025-01-14 PROCEDURE — 97110 THERAPEUTIC EXERCISES: CPT

## 2025-01-14 ASSESSMENT — PAIN DESCRIPTION - PAIN TYPE: TYPE: SURGICAL PAIN

## 2025-01-14 ASSESSMENT — PAIN DESCRIPTION - LOCATION: LOCATION: KNEE

## 2025-01-14 ASSESSMENT — PAIN DESCRIPTION - ORIENTATION: ORIENTATION: RIGHT

## 2025-01-14 ASSESSMENT — PAIN SCALES - GENERAL: PAINLEVEL_OUTOF10: 1

## 2025-01-14 ASSESSMENT — PAIN DESCRIPTION - DESCRIPTORS: DESCRIPTORS: DULL;ACHING

## 2025-01-14 NOTE — PROGRESS NOTES
[facility]  [address] [place] [address]  Phone: [phone]      Date: 2025  Patient: Sukumar Clarke  : 1960   Confirmed: Yes  MRN: [id number]  Referring Provider: Frantz Mock PA    Medical Diagnosis: Aftercare following joint replacement surgery [Z47.1]  Presence of right artificial knee joint [Z96.651]       Treatment Diagnosis: R knee pain, decreased R knee ROM and strength, impaired gait and mobility s/p TKA    Visit Information:  Insurance: Payor: [employer] [place] / Plan: [facility] / Product Type: Indemnity /   PT Visit Information  Onset Date: 24  Total # of Visits Approved: 15  Total # of Visits to Date: 2  No Show: 0  Canceled Appointment: 0  Progress Note Counter: 2/15    Subjective Information:  Subjective: Pt reports he is eager to discontinue using a walker because it it starting to give him a back ache. Pt states does it need it for anything but long distances. Pt has been using bandaids on his R knee incision on areas where he picked the scab. Pt states the pinkness is starting to go away and he just finished a course of antibiotics from the doctor.  HEP Compliance:  [x] Good [] Fair [] Poor [] Reports not doing due to:    Pain Screening  Patient Currently in Pain: Yes  Pain Assessment: 0-10  Pain Level: 1  Pain Type: Surgical pain  Pain Location: Knee  Pain Orientation: Right  Pain Descriptors: Dull, Aching    Treatment:  Exercises:  Exercises  Exercise 1: nustep: L3, S12 x 8 min (knee ROM)  Exercise 2: seated heel slide x 30 (96 deg flex)  Exercise 3: SAQ: 3 x 10  Exercise 4: supine heel slide: 3 x 10 (99 deg flex)  Exercise 5: step ups: 6\", 3 x 10 (form corrected to prevent premature knee extension)  Exercise 6: STS: x 15-20 (hips higher than knees)     *Indicates exercise, modality, or manual techniques to be initiated when appropriate    Objective Measures:     Assessment:      Assessment: Pt ambulatory throughout clinic without a device.

## 2025-01-17 ENCOUNTER — APPOINTMENT (OUTPATIENT)
Dept: PHYSICAL THERAPY | Age: 65
End: 2025-01-17
Payer: COMMERCIAL

## 2025-01-22 ENCOUNTER — OFFICE VISIT (OUTPATIENT)
Dept: ORTHOPEDIC SURGERY | Facility: CLINIC | Age: 65
End: 2025-01-22
Payer: COMMERCIAL

## 2025-01-22 DIAGNOSIS — Z47.1 AFTERCARE FOLLOWING RIGHT KNEE JOINT REPLACEMENT SURGERY: Primary | ICD-10-CM

## 2025-01-22 DIAGNOSIS — Z96.651 AFTERCARE FOLLOWING RIGHT KNEE JOINT REPLACEMENT SURGERY: Primary | ICD-10-CM

## 2025-01-22 PROCEDURE — 99211 OFF/OP EST MAY X REQ PHY/QHP: CPT | Performed by: ORTHOPAEDIC SURGERY

## 2025-01-23 ENCOUNTER — HOSPITAL ENCOUNTER (OUTPATIENT)
Dept: PHYSICAL THERAPY | Age: 65
Setting detail: THERAPIES SERIES
Discharge: HOME | End: 2025-01-23
Payer: COMMERCIAL

## 2025-01-23 PROCEDURE — 97110 THERAPEUTIC EXERCISES: CPT

## 2025-01-23 NOTE — PROGRESS NOTES
[place] [address] [place] [address]  Phone: [phone]      Date: 2025  Patient: Sukumar Clarke  : 1960   Confirmed: Yes  MRN: [id number]  Referring Provider: Frantz Mock PA    Medical Diagnosis: Aftercare following joint replacement surgery [Z47.1]  Presence of right artificial knee joint [Z96.651]       Treatment Diagnosis: R knee pain, decreased R knee ROM and strength, impaired gait and mobility s/p TKA    Visit Information:  Insurance: Payor: [employer] [place] / Plan: [facility] / Product Type: Indemnity /   PT Visit Information  Onset Date: 24  Total # of Visits Approved: 15  Total # of Visits to Date: 3  No Show: 0  Canceled Appointment: 0  Progress Note Counter: 3/15    Subjective Information:  Subjective: Pt reports he saw Dr Lee yesterday. Reports everything looks good, f/u again in 6 weeks (RTD 3/5/25). Pt denies any knee pain today. Pt arrives today using a cane.  HEP Compliance:  [x] Good [] Fair [] Poor [] Reports not doing due to:    Pain Screening  Patient Currently in Pain: Denies    Treatment:  Exercises:  Exercises  Exercise 1: nustep: L4, S12 x 9 min (knee ROM)  Exercise 2: 6\" step ups: 2 x 10 hugo  Exercise 3: 6\" janice step overs: fwd and lateral (in // bars)  Exercise 4: seated heel slide (quad set SLR at terminal ext): 2 x 10  Exercise 5: STS (no UE support): 2 x 10     *Indicates exercise, modality, or manual techniques to be initiated when appropriate    Objective Measures:     LTG 1 Current Status:: 25: -5 deg ext  LTG 2 Current Status:: 25: 100 deg flex     Assessment:      Assessment: Pt demo's improving R Knee ROM and strength. Pt still requires UE support to safely step over stationary barriers and to climb up and down steps/curbs. Pt instructed to remain consistent with HEP. Plan to continue 1x/wk.  Treatment Diagnosis: R knee pain, decreased R knee ROM and strength, impaired gait and mobility s/p TKA  Therapy

## 2025-01-24 NOTE — PROGRESS NOTES
History of present illness    64-year-old gentleman follow-up right total knee replacement from December 12.  He states doing well he is continue with his physical therapy uses a cane most of the time but not all the time.  Denies any chest pain or shortness of breath denies any fevers or chills he states his left knee is actually slowing him down more than his right knee currently.  He knows he has severe left knee arthritis and is likely needing left knee replacement sometime down the road      Past medical , Surgical, Family and social history reviewed.      Physical exam  General: No acute distress and breathing comfortably.  Patient is pleasant and cooperative with the examination.    Extremity  Right knee has well-healed midline incision and a couple small scabs he admits he has been picking at his incision.  No redness no drainage no sign infection.  Range of motion 0-1 05 no instability brisk cap refill Carton soft calf is nontender    Diagnostics      XR knee right 3 views    Result Date: 1/4/2025  Interpreted By:  Chrissy Sweeney, STUDY: Right knee, three views   INDICATION: Signs/Symptoms:s/p TKR   COMPARISON: 11/27/2023.   ACCESSION NUMBER(S): [id number]   ORDERING CLINICIAN: WING FLETCHER   FINDINGS: No acute fracture or malalignment. Status post total knee arthroplasty. Hardware is intact without perihardware fractures or lucencies.   Moderate knee joint effusion and anterior knee soft tissue swelling that are likely reactive in etiology.       1. Right total knee arthroplasty without hardware complication. 2. Moderate knee joint effusion and anterior knee soft tissue swelling that are likely reactive in etiology.   MACRO: None.   Signed by: Chrissy Sweeney 1/4/2025 10:50 AM Dictation workstation:   [id number]       Procedure  [ none]    Assessment  Healing right total knee replacement    Treatment plan  1.  Continue work on range of motion strengthening continue to  weight-bear as tolerated follow-up 6 weeks with x-rays sooner if he has increased pain or discomfort.  2. [   ]  3. [   ]  4.  All of the patient's questions were answered.    No orders of the defined types were placed in this encounter.      This note was prepared using voice recognition software.  The details of this note are correct and have been reviewed, and corrected to the best of my ability.  Some grammatical areas may persist related to the Dragon software    Stanton Rai MD  Senior Attending Physician  [facility]  [facility]thopedic [place]    [phone]

## 2025-01-30 ENCOUNTER — HOSPITAL ENCOUNTER (OUTPATIENT)
Dept: PHYSICAL THERAPY | Age: 65
Setting detail: THERAPIES SERIES
Discharge: HOME | End: 2025-01-30
Payer: COMMERCIAL

## 2025-01-30 PROCEDURE — 97110 THERAPEUTIC EXERCISES: CPT

## 2025-01-30 PROCEDURE — 97116 GAIT TRAINING THERAPY: CPT

## 2025-01-30 NOTE — PROGRESS NOTES
[id number] [place] [address]  Phone: [phone]      Date: 2025  Patient: Sukumar Clarke  : 1960   Confirmed: Yes  MRN: [id number]  Referring Provider: Frantz Mock PA    Medical Diagnosis: Aftercare following joint replacement surgery [Z47.1]  Presence of right artificial knee joint [Z96.651]       Treatment Diagnosis: R knee pain, decreased R knee ROM and strength, impaired gait and mobility s/p TKA    Visit Information:  Insurance: Payor: [employer] [place] / Plan: [facility] / Product Type: Indemnity /   PT Visit Information  Onset Date: 24  Total # of Visits Approved: 15  Total # of Visits to Date: 4  No Show: 0  Canceled Appointment: 0  Progress Note Counter: 4/15    Subjective Information:  Subjective: Pt is feeling pretty good today. He reports no pain for today.  Pt is compliant w/ HEP, feeling better about R knee ROM.  HEP Compliance:  [x] Good [] Fair [] Poor [] Reports not doing due to:    Pain Screening  Patient Currently in Pain: Denies    Treatment:  Exercises:  Exercises  Exercise 1: nustep: L4, S12 x 9 min (knee ROM)  Exercise 3: 6\" janice step overs: fwd and lateral (in // bars)  Exercise 4: seated heel slide (quad set SLR at terminal ext): 2 x 10  Exercise 5: STS (no UE support): 2 x 10, focus on equal WB BLE  Exercise 7: Gait / Balance training in // bars 2 laps each: Normal walking, High-stepping, Side-stepping, Tandem walking, Backwards stepping       *Indicates exercise, modality, or manual techniques to be initiated when appropriate    Objective Measures:        LTG 4 Current Status:: : Pt can amb 10 minutes w/ SPC before needing to sit down. 15-20 min when using shopping cart at store.  LTG 5 Current Status:: : Good compliance w/ HEP.          Assessment:      Assessment: Pt continues working on improve BLE strength and RLE ROM.  He shows improvement with STS w/ no UE support as he does it from lowered MAT table table.

## 2025-02-06 ENCOUNTER — HOSPITAL ENCOUNTER (OUTPATIENT)
Dept: PHYSICAL THERAPY | Age: 65
Setting detail: THERAPIES SERIES
Discharge: HOME | End: 2025-02-06
Payer: COMMERCIAL

## 2025-02-06 PROCEDURE — 97110 THERAPEUTIC EXERCISES: CPT

## 2025-02-06 ASSESSMENT — PAIN DESCRIPTION - ORIENTATION: ORIENTATION: RIGHT

## 2025-02-06 ASSESSMENT — PAIN DESCRIPTION - LOCATION: LOCATION: KNEE

## 2025-02-06 NOTE — PROGRESS NOTES
[address] [facility] [address]  Phone: [phone]      Date: 2025  Patient: Sukumar Clarke  : 1960   Confirmed: Yes  MRN: [id number]  Referring Provider: Frantz Mock PA    Medical Diagnosis: Aftercare following joint replacement surgery [Z47.1]  Presence of right artificial knee joint [Z96.651]       Treatment Diagnosis: R knee pain, decreased R knee ROM and strength, impaired gait and mobility s/p TKA    Visit Information:  Insurance: Payor: [employer] [place] / Plan: [facility] / Product Type: Indemnity /   PT Visit Information  Onset Date: 24  Total # of Visits Approved: 15  Total # of Visits to Date: 5  No Show: 0  Canceled Appointment: 0  Progress Note Counter: 5/15    Subjective Information:  Subjective: Pt reports he is feeling good today. Pt reports he still feels some unsteadiness when arising to standing. Pt still using his cane.  HEP Compliance:  [x] Good [] Fair [] Poor [] Reports not doing due to:    Pain Screening  Patient Currently in Pain: Denies  Pain Location: Knee  Pain Orientation: Right    Treatment:  Exercises:  Exercises  Exercise 1: nustep: L5, S12 x 8 min (legs only for strength/endurance)  Exercise 2: STS: 2x10  Exercise 3: step ups: 6\" RLE 2x10  Exercise 4: seated HS curls: RTB x 15 hugo, GTB 2x15  Exercise 5: SLS balance with cane support: 5-10\" hold x 5 RLE       *Indicates exercise, modality, or manual techniques to be initiated when appropriate    Objective Measures:     LTG 1 Current Status:: 25: 0 deg extension; able to hold LAQ at full extension  LTG 2 Current Status:: 25: 105-108 deg active flexion at arrival       Assessment:      Assessment: Pt progressing well with R knee ROM and strength. Pt's exercise tolerance mildly limited by L knee pain during WBing activity. Plan to f/u in 2-3 weeks for final check in, with anticipated discharge to a HEP.  Treatment Diagnosis: R knee pain, decreased R knee ROM and

## 2025-02-20 ENCOUNTER — HOSPITAL ENCOUNTER (OUTPATIENT)
Dept: PHYSICAL THERAPY | Age: 65
Setting detail: THERAPIES SERIES
Discharge: HOME | End: 2025-02-20
Payer: COMMERCIAL

## 2025-02-20 PROCEDURE — 97110 THERAPEUTIC EXERCISES: CPT

## 2025-02-20 NOTE — PROGRESS NOTES
[facility]  [address] [place] [address]  Phone: [phone]      Date: 2025  Patient: Sukumar Clarke  : 1960   Confirmed: Yes  MRN: [id number]  Referring Provider: Frantz Mock PA    Medical Diagnosis: Aftercare following joint replacement surgery [Z47.1]  Presence of right artificial knee joint [Z96.651]       Treatment Diagnosis: R knee pain, decreased R knee ROM and strength, impaired gait and mobility s/p TKA    Visit Information:  Insurance: Payor: [place] [employer] / Plan: [place] [employer] / Product Type: *No Product type* /   PT Visit Information  Onset Date: 24  Total # of Visits Approved: 15  Total # of Visits to Date: 6  No Show: 0  Canceled Appointment: 0  Progress Note Counter: 6/15    Subjective Information:  Subjective: I'm that we can make this my last visit. Pt reports pain ranges from 0/10 @ best to 2/10 at worse. Pt denies any additional questions or concerns about being done with therapy at this time.  HEP Compliance:  [x] Good [] Fair [] Poor [] Reports not doing due to:    Pain Screening  Patient Currently in Pain: Denies    Treatment:  Exercises:  Exercises  Exercise 1: nustep: L5, S12 x 8 min (legs only for strength/endurance)  Exercise 19: Functional test and measures.  Exercise 20: HEP: cont following DC from therapy       Objective Measures:           LTG 1 Current Status:: 25:  Knee Ext AROM 0*  LTG 2 Current Status:: 25 108*  LTG 3 Current Status:: 25 Right LE strength Knee Fle/ Ext 5/5  Hip: IR/ER 5/5  Flex 4+/5, ABD 4+/5  LTG 4 Current Status:: 25; Pt reports no limitations currently with ambualtion of community distance, continues to use SPC due to Left knee pain, able to walk short distance inside home without use of AD.  LTG 5 Current Status::  Pt reports good complaince with HEP performing daily.          Assessment:      Assessment: Pt with improved LEFS score of 53/80 15 point improvement from start. Pt reports feeling

## 2025-02-20 NOTE — PROGRESS NOTES
[facility]  [address] [place] [address]  Phone: [phone]    [] Certification  [] Recertification []  Plan of Care  [] Progress Note [] Discharge      Referring Provider: Frantz Mock PA     From:  Ramiro Prince PTA  Patient: Sukumar Clarke (64 y.o. male) : 1960 Date: 2025  Medical Diagnosis: Aftercare following joint replacement surgery [Z47.1]  Presence of right artificial knee joint [Z96.651]       Treatment Diagnosis: R knee pain, decreased R knee ROM and strength, impaired gait and mobility s/p TKA    Plan of Care/Certification Expiration Date:     Progress Report Period from:  2025  to 2025    Visits to Date: 6 No Show: 0 Cancelled Appts: 0    OBJECTIVE:       Long Term Goals - Time Frame for Long Term Goals : 6 weeks  Goals Current/ Discharge status Status   Long Term Goal 1: Terminal R knee extension consistent at 0 deg. LTG 1 Current Status:: 25:  Knee Ext AROM 0*   Met   Long Term Goal 2: R knee flexion AROM >/= 110 deg to allow pedaling a bike. LTG 2 Current Status:: 25 108*   Partially met   Long Term Goal 3: RLE strength 5/5 to allow climbing up and down 6\" steps with his surgical leg. LTG 3 Current Status:: 25 Right LE strength Knee Fle/ Ext 5/5  Hip: IR/ER 5/5  Flex 4+/5, ABD 4+/5   Partially met   Long Term Goal 4: Pt will be ambulatory community distances >/= 1/4 mile with LRAD. LTG 4 Current Status:: 25; Pt reports no limitations currently with ambualtion of community distance, continues to use SPC due to Left knee pain, able to walk short distance inside home without use of AD.   Met   Long Term Goal 5: Harmony with HEP to continue all recommended ROM and strengthening exercises. LTG 5 Current Status::  Pt reports good complaince with HEP performing daily.   Met     Assessment: Pt with improved LEFS score of 53/80 15 point improvement from start. Pt reports feeling able to return to full work duties any no

## 2025-03-05 ENCOUNTER — APPOINTMENT (OUTPATIENT)
Dept: ORTHOPEDIC SURGERY | Facility: CLINIC | Age: 65
End: 2025-03-05
Payer: COMMERCIAL

## 2025-03-10 ASSESSMENT — ENCOUNTER SYMPTOMS
WHEEZING: 0
NAUSEA: 0
EYES NEGATIVE: 1
SHORTNESS OF BREATH: 0
VOMITING: 0

## 2025-03-14 ENCOUNTER — APPOINTMENT (OUTPATIENT)
Dept: ORTHOPEDIC SURGERY | Facility: CLINIC | Age: 65
End: 2025-03-14
Payer: COMMERCIAL

## 2025-03-14 DIAGNOSIS — Z96.651 AFTERCARE FOLLOWING RIGHT KNEE JOINT REPLACEMENT SURGERY: Primary | ICD-10-CM

## 2025-03-14 DIAGNOSIS — Z47.1 AFTERCARE FOLLOWING RIGHT KNEE JOINT REPLACEMENT SURGERY: Primary | ICD-10-CM

## 2025-03-14 PROCEDURE — 99211 OFF/OP EST MAY X REQ PHY/QHP: CPT | Performed by: PHYSICIAN ASSISTANT

## 2025-03-14 NOTE — LETTER
[facility]  [address] [place]  [facility] [address]  PHONE: [phone]  FAX: [phone]      March 14, 2025    Patient: Pedro Lott   YOB: 1960   Date of Visit: 3/14/2025       To Whom It May Concern,    Pedro Lott was seen in my clinic on 3/14/2025, he has been advised he may return to work on 3/18/2025, with no restrictions.    If you have any questions or concerns, please contact the office by phone or fax.  Phone: [phone] or Fax: [phone]        Sincerely,      Hany Foreman PA-C

## 2025-03-14 NOTE — PROGRESS NOTES
Subjective    Patient ID: Pedro    Chief Complaint:   Chief Complaint   Patient presents with    Right Knee - Post-op Visit     RT TKR 12/12/24, 3 weeks out     History of present illness    64-year-old gentleman presented clinic today for his 3-month postop follow-up visit status post right total knee replacement.  Overall doing well.  Using a cane for assistance.  He still has mild numbness over the anterior lateral aspect of the right knee which is normal at this time.  He states that he walked into a cabinet a few days ago and acquired an abrasion over the anterior lateral aspect of the right knee.  No fevers chills reported.  He has an upcoming dentist appointment later on this afternoon and his already taken his prophylactic antibiotics.  He has noticed overall improvement with range of motion as well as overall function.  He is eager to get back to work which starts on 3/18/2025.      Past medical , Surgical, Family and social history reviewed.      Physical exam  General: No acute distress and breathing comfortably.  Patient is pleasant and cooperative with the examination.    Extremity  Right knee is neurovascular intact.  Good range of motion on exam 0 to 108 degrees.  Mild edema of the right knee.  Small half dollar size abrasion over the anterior lateral aspect of the right knee.  Incision intact incision well-healed at this time without abnormality or infection.  No erythema present.  No drainage.  Compartment soft.  Capillary refill within normal is distally.  No calf swelling or tenderness.    Diagnostics  [ none]  No results found.     Procedure  [ none]    Assessment  Status post right total knee replacement    Treatment plan  1.  He will continue with wound/dressing changes and treatment with Neosporin which has been helping.  2.  Continue with weightbearing activity as tolerated.  Return to work note given dated for 3/18/2025.  3.  He will follow-up with us in 3 months with new x-rays of the  right knee at that time.  4.  All of the patient's questions were answered.    No orders of the defined types were placed in this encounter.      This note was prepared using voice recognition software.  The details of this note are correct and have been reviewed, and corrected to the best of my ability.  Some grammatical areas may persist related to the Dragon software    Hany Foreman PA-C, [facility]  [facility] [place]    [phone]

## 2025-06-11 ENCOUNTER — APPOINTMENT (OUTPATIENT)
Dept: UROLOGY | Facility: CLINIC | Age: 65
End: 2025-06-11
Payer: COMMERCIAL

## 2025-06-11 VITALS — TEMPERATURE: 96.9 F | SYSTOLIC BLOOD PRESSURE: 161 MMHG | HEART RATE: 66 BPM | DIASTOLIC BLOOD PRESSURE: 83 MMHG

## 2025-06-11 DIAGNOSIS — N48.9 ABNORMALITY OF PENIS: Primary | ICD-10-CM

## 2025-06-11 DIAGNOSIS — E66.01 MORBID OBESITY (MULTI): ICD-10-CM

## 2025-06-11 DIAGNOSIS — N48.83 ACQUIRED BURIED PENIS: ICD-10-CM

## 2025-06-11 DIAGNOSIS — R82.90 ABNORMAL URINALYSIS: ICD-10-CM

## 2025-06-11 LAB
POC APPEARANCE, URINE: CLEAR
POC BILIRUBIN, URINE: NEGATIVE
POC BLOOD, URINE: ABNORMAL
POC COLOR, URINE: ABNORMAL
POC GLUCOSE, URINE: NEGATIVE MG/DL
POC KETONES, URINE: NEGATIVE MG/DL
POC LEUKOCYTES, URINE: ABNORMAL
POC NITRITE,URINE: NEGATIVE
POC PH, URINE: 6.5 PH
POC PROTEIN, URINE: ABNORMAL MG/DL
POC SPECIFIC GRAVITY, URINE: 1.02
POC UROBILINOGEN, URINE: 1 EU/DL

## 2025-06-11 PROCEDURE — 81003 URINALYSIS AUTO W/O SCOPE: CPT | Performed by: STUDENT IN AN ORGANIZED HEALTH CARE EDUCATION/TRAINING PROGRAM

## 2025-06-11 PROCEDURE — 99204 OFFICE O/P NEW MOD 45 MIN: CPT | Performed by: STUDENT IN AN ORGANIZED HEALTH CARE EDUCATION/TRAINING PROGRAM

## 2025-06-11 RX ORDER — TRAMADOL HYDROCHLORIDE 50 MG/1
1 TABLET, FILM COATED ORAL NIGHTLY PRN
COMMUNITY
Start: 2025-04-26

## 2025-06-11 NOTE — PROGRESS NOTES
Scribed for Dr. Phillip Lewis by Chinedu Arshad. I, Dr. Phillip Lewis have personally reviewed and agreed with the information entered by the Virtual Scribe. 06/11/25.    ASSESSMENT:  Problem List Items Addressed This Visit       Morbid obesity (Multi)    Acquired buried penis - Primary     Other Visit Diagnoses         Abnormal urinalysis        Relevant Orders    Urine Culture (Completed)           PLAN:  #Buried penis  Discussed non-surgical vs surgical options. In his case, recommended buried penis repair + monsplasty + circumcision. Risks, benefits, and complications reviewed. Counseled on post-operative course and expectations. He elects to take some time to consider his options. In the interim, he plans to continue to working on his weight (down from 430 to 320 lbs). He agree's to return in Jan 2026 to revisit options and surgical planning.     #Abnormal urinalysis  IO urinalysis showed trace blood, proteinuria, small leuks.   Send for culture, treat based on C+S.     All questions were answered to the patient’s satisfaction.  Patient agrees with the plan and wishes to proceed.  Continue follow-up for ongoing care of his chronic medical conditions.       History of Present Illness (HPI):  Pedro presents as a new patient for an evaluation.  The patient’s EMR has been reviewed.  Lives in [place], [place].   Occupation: [employer].   ; Wife passed away.     Hx of buried penis, ED, and obesity.   Referred by his PCP to discuss options.     TODAY: (06/11/25)  Reports he has been doing well overall.   Expressed interest in undergoing surgery for his buried penis.   He has been working on losing weight.   Previously he was 430 lbs; now is about 320 lbs.   States he was down to ~ 300 lbs;   but re-gained some weight after his recent knee surgery.   IO urinalysis showed trace blood, proteinuria, small leuks.     PMH: Buried penis, ED, joint pain, obesity  PSH: R total knee replacement  (12/12/24)  FH: Denies FH of  malignancy.   SH: Former smoker.     Medical History[1]  Surgical History[2]  Family History[3]  Tobacco Use History[4]  Current Medications[5]  Allergies[6]  Past medical, surgical, family and social history in the chart was reviewed and is accurate including any additions to what is in this HPI.    REVIEW OF SYSTEMS (ROS):   Constitutional: denies any unintentional weight loss or change in strength.  Integumentary: denies any rashes or pruritus.  Eyes: denies any double vision or eye pain.  Ear/Nose/Mouth/Throat: denies any nosebleeds or gum bleeds.  Cardiovascular: denies any chest pain or syncope.  Respiratory: denies hemoptysis.  Gastrointestinal: denies nausea or vomiting.  Musculoskeletal: denies muscle cramping or weakness.  Neurologic: denies convulsions or seizures.  Hematologic/Lymphatic: denies bleeding tendencies.  Endocrine: denies heat/cold intolerance.  All other systems have been reviewed and are negative unless otherwise noted in the HPI.     OBJECTIVE:  There were no vitals taken for this visit.  PHYSICAL EXAM:  Constitutional: No obvious distress.  Eyes: Non-injected conjunctiva, sclera clear, EOMI.  Ears/Nose/Mouth/Throat: No obvious drainage per ears or nose.  Cardiovascular: Extremities are warm and well perfused. No edema, cyanosis or pallor.  Respiratory: No audible wheezing/stridor; respirations do not appear labored.  Gastrointestinal: Abdomen soft, not distended.  Musculoskeletal: Normal ROM of extremities.  Skin: No obvious rashes or open sores.  Neurologic: Alert and oriented, CN 2-12 grossly intact.  Psychiatric: Answers questions appropriately with normal affect.  Hematologic/Lymphatic/Immunologic: No obvious bruises or sites of spontaneous bleeding.  Genitourinary: No CVA tenderness, bladder not palpable.   Moderate mons fat pad  Large abdominal pannus with good separation.  Complete buried penis.   Primarily preserved penile skin with cicatrix.    Unable to express penis but palpably normal.     LABS & IMAGING:  Basic Labs:  Lab Results   Component Value Date    WBC 7.2 2024    HGB 13.3 (L) 2024    HCT 38.8 (L) 2024     2024     2024    K 4.2 2024     2024    ALT 12 2024    AST 13 2024    CREATININE 0.81 2024    BUN 18 2024    CO2 26 2024    TSH 1.46 2024       Scribed for Dr. Phillip Lewis by Chinedu Arshad.  By signing my name below, I, Jay Proctor attest that this documentation has been prepared under the direction and in the presence of Phillip Lewis MD. All medical record entries made by the Scribe were at my direction or personally dictated by me. I have reviewed the chart and agree that the record accurately reflects my personal performance of the history, physical exam, discussion and plan.           [1] No past medical history on file.  [2]   Past Surgical History:  Procedure Laterality Date    OTHER SURGICAL HISTORY  2022    Colonoscopy   [3]   Family History  Problem Relation Name Age of Onset    Diabetes Mother      Heart attack Father     [4]   Social History  Tobacco Use   Smoking Status Former    Current packs/day: 0.00    Average packs/day: 2.5 packs/day for 15.2 years (38.1 ttl pk-yrs)    Types: Cigarettes    Start date:     Quit date: 2004    Years since quittin.2   Smokeless Tobacco Never   [5]   Current Outpatient Medications   Medication Sig Dispense Refill    acetaminophen (Tylenol) 500 mg tablet Take 1 tablet (500 mg) by mouth every 6 hours if needed.      atorvastatin (Lipitor) 40 mg tablet Take 1 tablet (40 mg) by mouth once daily.      coenzyme Q10-vitamin E 100-5 mg-unit capsule Take by mouth.      cyanocobalamin, vitamin B-12, 1,000 mcg capsule Take by mouth once daily.      cyclobenzaprine (Flexeril) 10 mg tablet Take 1 tablet (10 mg) by mouth as needed at bedtime for muscle spasms. 30 tablet 0     lisinopril 40 mg tablet Take 1 tablet (40 mg) by mouth once daily.      meloxicam (Mobic) 15 mg tablet Take 1 tablet (15 mg) by mouth once daily as needed.      metFORMIN (Glucophage) 500 mg tablet Take 1 tablet (500 mg) by mouth 2 times a day before meals.      nystatin-triamcinolone (Mycolog II) cream apply to the affected area(s) twice daily      omeprazole (PriLOSEC) 20 mg DR capsule Take 1 capsule (20 mg) by mouth once daily.      pentazocine-naloxone (Talwin NX) 50-0.5 mg tablet Take 1 tablet by mouth 2 times a day as needed for pain.      potassium chloride CR 10 mEq ER tablet Take 2 tablets (20 mEq) by mouth early in the morning..      spironolactone (Aldactone) 50 mg tablet Take 1 tablet (50 mg) by mouth once daily.      sulfamethoxazole-trimethoprim (Bactrim DS) 800-160 mg tablet Take 1 tablet by mouth every 12 hours.       No current facility-administered medications for this visit.   [6] No Known Allergies

## 2025-06-13 LAB — BACTERIA UR CULT: ABNORMAL

## 2025-06-16 PROBLEM — N48.9 ABNORMALITY OF PENIS: Status: ACTIVE | Noted: 2025-06-16

## 2025-06-16 PROBLEM — N48.83 ACQUIRED BURIED PENIS: Status: ACTIVE | Noted: 2025-06-16

## 2025-06-17 DIAGNOSIS — N30.00 ACUTE CYSTITIS WITHOUT HEMATURIA: Primary | ICD-10-CM

## 2025-06-17 RX ORDER — CIPROFLOXACIN 500 MG/1
500 TABLET, FILM COATED ORAL 2 TIMES DAILY
Qty: 14 TABLET | Refills: 0 | Status: SHIPPED | OUTPATIENT
Start: 2025-06-17 | End: 2025-06-24

## 2025-06-18 ENCOUNTER — HOSPITAL ENCOUNTER (OUTPATIENT)
Dept: RADIOLOGY | Facility: HOSPITAL | Age: 65
Discharge: HOME | End: 2025-06-18
Payer: COMMERCIAL

## 2025-06-18 ENCOUNTER — APPOINTMENT (OUTPATIENT)
Dept: ORTHOPEDIC SURGERY | Facility: CLINIC | Age: 65
End: 2025-06-18
Payer: COMMERCIAL

## 2025-06-18 DIAGNOSIS — M17.11 PRIMARY OSTEOARTHRITIS OF RIGHT KNEE: ICD-10-CM

## 2025-06-18 DIAGNOSIS — M17.11 PRIMARY OSTEOARTHRITIS OF RIGHT KNEE: Primary | ICD-10-CM

## 2025-06-18 PROCEDURE — 73562 X-RAY EXAM OF KNEE 3: CPT | Mod: RT

## 2025-06-18 PROCEDURE — 73562 X-RAY EXAM OF KNEE 3: CPT | Mod: RIGHT SIDE | Performed by: RADIOLOGY

## 2025-06-19 NOTE — PROGRESS NOTES
"                    History of present illness    64-year-old male follow-up right total knee replacement from December.  At the time of his last visit he was doing great now he is complaining of some issues with his right leg he has pain in his right thigh and it feels like he has a bit of a leg length inequality when he tries to stand no new injury.  No fevers or chills no redness or drainage.       Past medical , Surgical, Family and social history reviewed.      Physical exam  General: No acute distress and breathing comfortably.  Patient is pleasant and cooperative with the examination.    Extremity  Right knee examination shows the incisions well-healed he is got some scabs along the incision he states that he is a \"\" full knee extension flexion 115 degrees no instability varus valgus brisk cap refill compartments are soft calf is nontender.  Of note he has significant pain with internal extra rotation hip and very limited internal rotation on the right side.    Diagnostics  Right knee x-rays are obtained today personally reviewed and interpreted by myself.    Imaging  No results found.    Cardiology, Vascular, and Other Imaging  No other imaging results found for the past 7 days       Procedure  [ none]    Assessment  Status post right total knee replacement    Treatment plan  1.  Reassured him everything looks okay as far as his knee replacement goes.  We also discussed very very uncommon and lesser severe deformity to have any sort of leg length inequality of any significance after knee replacement surgery this is not that uncommon after hip replacement surgery however  2.  My concern at this time as he has very limited range of motion of his hip and moderate pain with internal ex rotation of the hip I think his issue with leg length and pain that is now sitting in his thigh is likely related to hip arthritis.  His knee replacement seems to be coming along very well.  He does not want to do anything " right now with his hip organ to just continue to monitor and he will see me back in about 3 months for repeat evaluation if he still having issues we will get an AP pelvic x-ray for further evaluation.  3. [   ]  4.  All of the patient's questions were answered.    Orders Placed This Encounter    XR knee right 3 views       This note was prepared using voice recognition software.  The details of this note are correct and have been reviewed, and corrected to the best of my ability.  Some grammatical areas may persist related to the Dragon software    Stanton Rai MD  Senior Attending Physician  [facility]  [facility] [place]    [phone]

## 2025-09-06 ENCOUNTER — HOSPITAL ENCOUNTER (EMERGENCY)
Age: 65
Discharge: ANOTHER HOSPITAL | End: 2025-09-06
Attending: EMERGENCY MEDICINE
Payer: COMMERCIAL

## 2025-09-06 ENCOUNTER — APPOINTMENT (OUTPATIENT)
Dept: CT IMAGING | Age: 65
End: 2025-09-06
Payer: COMMERCIAL

## 2025-09-06 ENCOUNTER — APPOINTMENT (OUTPATIENT)
Dept: GENERAL RADIOLOGY | Age: 65
End: 2025-09-06
Payer: COMMERCIAL

## 2025-09-06 ENCOUNTER — HOSPITAL ENCOUNTER (INPATIENT)
Age: 65
End: 2025-09-06
Payer: COMMERCIAL

## 2025-09-06 VITALS
OXYGEN SATURATION: 99 % | WEIGHT: 315 LBS | DIASTOLIC BLOOD PRESSURE: 94 MMHG | BODY MASS INDEX: 42.66 KG/M2 | HEART RATE: 58 BPM | SYSTOLIC BLOOD PRESSURE: 156 MMHG | TEMPERATURE: 97.7 F | RESPIRATION RATE: 13 BRPM | HEIGHT: 72 IN

## 2025-09-06 DIAGNOSIS — J94.2 HEMOTHORAX: ICD-10-CM

## 2025-09-06 DIAGNOSIS — Q25.40 ABNORMALITY OF THORACIC AORTA: ICD-10-CM

## 2025-09-06 DIAGNOSIS — K20.90 ESOPHAGITIS: Primary | ICD-10-CM

## 2025-09-06 LAB
ALBUMIN SERPL-MCNC: 4.4 G/DL (ref 3.5–4.6)
ALP SERPL-CCNC: 71 U/L (ref 35–104)
ALT SERPL-CCNC: 7 U/L (ref 0–41)
ANION GAP SERPL CALCULATED.3IONS-SCNC: 16 MEQ/L (ref 9–15)
APTT PPP: 26.5 SEC (ref 24.4–36.8)
AST SERPL-CCNC: 13 U/L (ref 0–40)
BASOPHILS # BLD: 0.1 K/UL (ref 0–0.2)
BASOPHILS NFR BLD: 0.6 %
BILIRUB SERPL-MCNC: 0.9 MG/DL (ref 0.2–0.7)
BILIRUB UR QL STRIP: NEGATIVE
BUN SERPL-MCNC: 10 MG/DL (ref 8–23)
CALCIUM SERPL-MCNC: 9.5 MG/DL (ref 8.5–9.9)
CHLORIDE SERPL-SCNC: 100 MEQ/L (ref 95–107)
CK SERPL-CCNC: 79 U/L (ref 0–190)
CLARITY UR: CLEAR
CO2 SERPL-SCNC: 23 MEQ/L (ref 20–31)
COLOR UR: YELLOW
CREAT SERPL-MCNC: 0.86 MG/DL (ref 0.7–1.2)
EKG ATRIAL RATE: 63 BPM
EKG DIAGNOSIS: NORMAL
EKG P AXIS: 16 DEGREES
EKG P-R INTERVAL: 184 MS
EKG Q-T INTERVAL: 440 MS
EKG QRS DURATION: 102 MS
EKG QTC CALCULATION (BAZETT): 450 MS
EKG R AXIS: -34 DEGREES
EKG T AXIS: 13 DEGREES
EKG VENTRICULAR RATE: 63 BPM
EOSINOPHIL # BLD: 0 K/UL (ref 0–0.7)
EOSINOPHIL NFR BLD: 0.5 %
ERYTHROCYTE [DISTWIDTH] IN BLOOD BY AUTOMATED COUNT: 13.2 % (ref 11.5–14.5)
GLOBULIN SER CALC-MCNC: 3.2 G/DL (ref 2.3–3.5)
GLUCOSE SERPL-MCNC: 152 MG/DL (ref 70–99)
GLUCOSE UR STRIP-MCNC: NEGATIVE MG/DL
HCT VFR BLD AUTO: 42.7 % (ref 42–52)
HGB BLD-MCNC: 15.1 G/DL (ref 14–18)
HGB UR QL STRIP: NEGATIVE
INR PPP: 1.1
KETONES UR STRIP-MCNC: NEGATIVE MG/DL
LACTATE BLDV-SCNC: 3.6 MMOL/L (ref 0.5–2.2)
LACTATE BLDV-SCNC: 4.5 MMOL/L (ref 0.5–2.2)
LEUKOCYTE ESTERASE UR QL STRIP: NEGATIVE
LIPASE SERPL-CCNC: 14 U/L (ref 12–95)
LYMPHOCYTES # BLD: 1.1 K/UL (ref 1–4.8)
LYMPHOCYTES NFR BLD: 13 %
MCH RBC QN AUTO: 33.2 PG (ref 27–31.3)
MCHC RBC AUTO-ENTMCNC: 35.4 % (ref 33–37)
MCV RBC AUTO: 93.8 FL (ref 79–92.2)
MONOCYTES # BLD: 0.4 K/UL (ref 0.2–0.8)
MONOCYTES NFR BLD: 4.2 %
NEUTROPHILS # BLD: 7 K/UL (ref 1.4–6.5)
NEUTS SEG NFR BLD: 81.2 %
NITRITE UR QL STRIP: NEGATIVE
PH UR STRIP: 5.5 [PH] (ref 5–9)
PLATELET # BLD AUTO: 219 K/UL (ref 130–400)
POTASSIUM SERPL-SCNC: 2.8 MEQ/L (ref 3.4–4.9)
PROCALCITONIN SERPL IA-MCNC: 0.05 NG/ML (ref 0–0.15)
PROT SERPL-MCNC: 7.6 G/DL (ref 6.3–8)
PROT UR STRIP-MCNC: NEGATIVE MG/DL
PROTHROMBIN TIME: 14.1 SEC (ref 12.3–14.9)
RBC # BLD AUTO: 4.55 M/UL (ref 4.7–6.1)
SODIUM SERPL-SCNC: 139 MEQ/L (ref 135–144)
SP GR UR STRIP: 1.09 (ref 1–1.03)
TROPONIN, HIGH SENSITIVITY: 17 NG/L (ref 0–19)
UROBILINOGEN UR STRIP-ACNC: 0.2 E.U./DL
WBC # BLD AUTO: 8.6 K/UL (ref 4.8–10.8)

## 2025-09-06 PROCEDURE — 74177 CT ABD & PELVIS W/CONTRAST: CPT

## 2025-09-06 PROCEDURE — 36415 COLL VENOUS BLD VENIPUNCTURE: CPT

## 2025-09-06 PROCEDURE — 71275 CT ANGIOGRAPHY CHEST: CPT

## 2025-09-06 PROCEDURE — 85730 THROMBOPLASTIN TIME PARTIAL: CPT

## 2025-09-06 PROCEDURE — 85025 COMPLETE CBC W/AUTO DIFF WBC: CPT

## 2025-09-06 PROCEDURE — 6360000002 HC RX W HCPCS: Performed by: EMERGENCY MEDICINE

## 2025-09-06 PROCEDURE — 2500000003 HC RX 250 WO HCPCS: Performed by: EMERGENCY MEDICINE

## 2025-09-06 PROCEDURE — 80053 COMPREHEN METABOLIC PANEL: CPT

## 2025-09-06 PROCEDURE — 71045 X-RAY EXAM CHEST 1 VIEW: CPT

## 2025-09-06 PROCEDURE — 83690 ASSAY OF LIPASE: CPT

## 2025-09-06 PROCEDURE — 85610 PROTHROMBIN TIME: CPT

## 2025-09-06 PROCEDURE — 6360000004 HC RX CONTRAST MEDICATION: Performed by: EMERGENCY MEDICINE

## 2025-09-06 PROCEDURE — 87040 BLOOD CULTURE FOR BACTERIA: CPT

## 2025-09-06 PROCEDURE — 84145 PROCALCITONIN (PCT): CPT

## 2025-09-06 PROCEDURE — 2580000003 HC RX 258: Performed by: EMERGENCY MEDICINE

## 2025-09-06 PROCEDURE — 81003 URINALYSIS AUTO W/O SCOPE: CPT

## 2025-09-06 PROCEDURE — 83605 ASSAY OF LACTIC ACID: CPT

## 2025-09-06 PROCEDURE — 82550 ASSAY OF CK (CPK): CPT

## 2025-09-06 PROCEDURE — 6370000000 HC RX 637 (ALT 250 FOR IP): Performed by: EMERGENCY MEDICINE

## 2025-09-06 PROCEDURE — 71260 CT THORAX DX C+: CPT

## 2025-09-06 PROCEDURE — 84484 ASSAY OF TROPONIN QUANT: CPT

## 2025-09-06 RX ORDER — SODIUM CHLORIDE 9 MG/ML
INJECTION, SOLUTION INTRAVENOUS CONTINUOUS
Status: DISCONTINUED | OUTPATIENT
Start: 2025-09-06 | End: 2025-09-07 | Stop reason: HOSPADM

## 2025-09-06 RX ORDER — IOPAMIDOL 755 MG/ML
100 INJECTION, SOLUTION INTRAVASCULAR
Status: COMPLETED | OUTPATIENT
Start: 2025-09-06 | End: 2025-09-06

## 2025-09-06 RX ORDER — IOPAMIDOL 755 MG/ML
75 INJECTION, SOLUTION INTRAVASCULAR
Status: COMPLETED | OUTPATIENT
Start: 2025-09-06 | End: 2025-09-06

## 2025-09-06 RX ORDER — HYDROMORPHONE HYDROCHLORIDE 1 MG/ML
1 INJECTION, SOLUTION INTRAMUSCULAR; INTRAVENOUS; SUBCUTANEOUS
Refills: 0 | Status: COMPLETED | OUTPATIENT
Start: 2025-09-06 | End: 2025-09-06

## 2025-09-06 RX ORDER — FENTANYL CITRATE 50 UG/ML
50 INJECTION, SOLUTION INTRAMUSCULAR; INTRAVENOUS ONCE
Status: COMPLETED | OUTPATIENT
Start: 2025-09-06 | End: 2025-09-06

## 2025-09-06 RX ORDER — ONDANSETRON 2 MG/ML
4 INJECTION INTRAMUSCULAR; INTRAVENOUS ONCE
Status: COMPLETED | OUTPATIENT
Start: 2025-09-06 | End: 2025-09-06

## 2025-09-06 RX ORDER — 0.9 % SODIUM CHLORIDE 0.9 %
1000 INTRAVENOUS SOLUTION INTRAVENOUS ONCE
Status: COMPLETED | OUTPATIENT
Start: 2025-09-06 | End: 2025-09-06

## 2025-09-06 RX ORDER — POTASSIUM CHLORIDE 7.45 MG/ML
10 INJECTION INTRAVENOUS ONCE
Status: COMPLETED | OUTPATIENT
Start: 2025-09-06 | End: 2025-09-06

## 2025-09-06 RX ADMIN — FENTANYL CITRATE 50 MCG: 50 INJECTION INTRAMUSCULAR; INTRAVENOUS at 23:13

## 2025-09-06 RX ADMIN — ONDANSETRON 4 MG: 2 INJECTION, SOLUTION INTRAMUSCULAR; INTRAVENOUS at 16:42

## 2025-09-06 RX ADMIN — FAMOTIDINE 20 MG: 10 INJECTION, SOLUTION INTRAVENOUS at 17:13

## 2025-09-06 RX ADMIN — SODIUM CHLORIDE 1000 ML: 0.9 INJECTION, SOLUTION INTRAVENOUS at 19:50

## 2025-09-06 RX ADMIN — POTASSIUM BICARBONATE 50 MEQ: 977.5 TABLET, EFFERVESCENT ORAL at 17:11

## 2025-09-06 RX ADMIN — PANTOPRAZOLE SODIUM 80 MG: 40 INJECTION, POWDER, FOR SOLUTION INTRAVENOUS at 18:13

## 2025-09-06 RX ADMIN — IOPAMIDOL 100 ML: 755 INJECTION, SOLUTION INTRAVENOUS at 20:18

## 2025-09-06 RX ADMIN — IOPAMIDOL 75 ML: 755 INJECTION, SOLUTION INTRAVENOUS at 17:27

## 2025-09-06 RX ADMIN — HYDROMORPHONE HYDROCHLORIDE 1 MG: 1 INJECTION, SOLUTION INTRAMUSCULAR; INTRAVENOUS; SUBCUTANEOUS at 16:42

## 2025-09-06 RX ADMIN — SODIUM CHLORIDE: 0.9 INJECTION, SOLUTION INTRAVENOUS at 16:40

## 2025-09-06 RX ADMIN — POTASSIUM CHLORIDE 10 MEQ: 7.46 INJECTION, SOLUTION INTRAVENOUS at 18:08

## 2025-09-06 RX ADMIN — PIPERACILLIN AND TAZOBACTAM 4500 MG: 4; .5 INJECTION, POWDER, LYOPHILIZED, FOR SOLUTION INTRAVENOUS at 19:52

## 2025-09-06 RX ADMIN — IOPAMIDOL 75 ML: 755 INJECTION, SOLUTION INTRAVENOUS at 18:53

## 2025-09-06 RX ADMIN — VANCOMYCIN HYDROCHLORIDE 2250 MG: 1 INJECTION, POWDER, LYOPHILIZED, FOR SOLUTION INTRAVENOUS at 20:36

## 2025-09-06 ASSESSMENT — PAIN SCALES - GENERAL
PAINLEVEL_OUTOF10: 5
PAINLEVEL_OUTOF10: 5

## 2025-09-06 ASSESSMENT — ENCOUNTER SYMPTOMS
CHEST TIGHTNESS: 1
ABDOMINAL PAIN: 0
EYES NEGATIVE: 1
NAUSEA: 0
ALLERGIC/IMMUNOLOGIC NEGATIVE: 1
STRIDOR: 0
RHINORRHEA: 0
TROUBLE SWALLOWING: 0
WHEEZING: 0
SHORTNESS OF BREATH: 0
VOMITING: 0

## 2025-09-06 ASSESSMENT — PAIN DESCRIPTION - DESCRIPTORS
DESCRIPTORS: ACHING
DESCRIPTORS: ACHING

## 2025-09-06 ASSESSMENT — LIFESTYLE VARIABLES
HOW OFTEN DO YOU HAVE A DRINK CONTAINING ALCOHOL: NEVER
HOW MANY STANDARD DRINKS CONTAINING ALCOHOL DO YOU HAVE ON A TYPICAL DAY: PATIENT DOES NOT DRINK

## 2025-09-06 ASSESSMENT — PAIN - FUNCTIONAL ASSESSMENT
PAIN_FUNCTIONAL_ASSESSMENT: 0-10
PAIN_FUNCTIONAL_ASSESSMENT: 0-10

## 2025-09-06 ASSESSMENT — PAIN DESCRIPTION - LOCATION
LOCATION: CHEST
LOCATION: CHEST

## 2025-09-07 ENCOUNTER — HOSPITAL ENCOUNTER (INPATIENT)
Facility: HOSPITAL | Age: 65
End: 2025-09-07
Attending: EMERGENCY MEDICINE | Admitting: INTERNAL MEDICINE
Payer: COMMERCIAL

## 2025-09-07 DIAGNOSIS — I71.00 INTRAMURAL AORTIC HEMATOMA (MULTI): Primary | ICD-10-CM

## 2025-09-07 LAB
ABO GROUP (TYPE) IN BLOOD: NORMAL
ABO GROUP (TYPE) IN BLOOD: NORMAL
ALBUMIN SERPL BCP-MCNC: 3.5 G/DL (ref 3.4–5)
ALBUMIN SERPL BCP-MCNC: 3.5 G/DL (ref 3.4–5)
ALBUMIN SERPL BCP-MCNC: 4.3 G/DL (ref 3.4–5)
ALP SERPL-CCNC: 55 U/L (ref 33–136)
ALT SERPL W P-5'-P-CCNC: 7 U/L (ref 10–52)
AMPHETAMINES UR QL SCN: ABNORMAL
ANION GAP BLDV CALCULATED.4IONS-SCNC: 10 MMOL/L (ref 10–25)
ANION GAP SERPL CALC-SCNC: 11 MMOL/L (ref 10–20)
ANION GAP SERPL CALC-SCNC: 12 MMOL/L (ref 10–20)
ANION GAP SERPL CALC-SCNC: 16 MMOL/L (ref 10–20)
ANTIBODY SCREEN: NORMAL
ANTIBODY SCREEN: NORMAL
APPEARANCE UR: CLEAR
APTT PPP: 20 SECONDS (ref 26–36)
AST SERPL W P-5'-P-CCNC: 14 U/L (ref 9–39)
BACTERIA BLD CULT ORG #2: NORMAL
BACTERIA BLD CULT: NORMAL
BARBITURATES UR QL SCN: ABNORMAL
BASE EXCESS BLDV CALC-SCNC: -1.7 MMOL/L (ref -2–3)
BASOPHILS # BLD AUTO: 0.04 X10*3/UL (ref 0–0.1)
BASOPHILS # BLD AUTO: 0.05 X10*3/UL (ref 0–0.1)
BASOPHILS NFR BLD AUTO: 0.4 %
BASOPHILS NFR BLD AUTO: 0.4 %
BENZODIAZ UR QL SCN: ABNORMAL
BILIRUB SERPL-MCNC: 0.9 MG/DL (ref 0–1.2)
BILIRUB UR STRIP.AUTO-MCNC: NEGATIVE MG/DL
BODY TEMPERATURE: 37 DEGREES CELSIUS
BUN SERPL-MCNC: 10 MG/DL (ref 6–23)
BUN SERPL-MCNC: 11 MG/DL (ref 6–23)
BUN SERPL-MCNC: 9 MG/DL (ref 6–23)
BZE UR QL SCN: ABNORMAL
CA-I BLDV-SCNC: 1.1 MMOL/L (ref 1.1–1.33)
CALCIUM SERPL-MCNC: 7.6 MG/DL (ref 8.6–10.6)
CALCIUM SERPL-MCNC: 8 MG/DL (ref 8.6–10.6)
CALCIUM SERPL-MCNC: 9.1 MG/DL (ref 8.6–10.6)
CANNABINOIDS UR QL SCN: ABNORMAL
CARDIAC TROPONIN I PNL SERPL HS: 9 NG/L (ref 0–53)
CHLORIDE BLDV-SCNC: 109 MMOL/L (ref 98–107)
CHLORIDE SERPL-SCNC: 103 MMOL/L (ref 98–107)
CHLORIDE SERPL-SCNC: 106 MMOL/L (ref 98–107)
CHLORIDE SERPL-SCNC: 108 MMOL/L (ref 98–107)
CO2 SERPL-SCNC: 23 MMOL/L (ref 21–32)
CO2 SERPL-SCNC: 23 MMOL/L (ref 21–32)
CO2 SERPL-SCNC: 26 MMOL/L (ref 21–32)
COLOR UR: YELLOW
CREAT SERPL-MCNC: 0.61 MG/DL (ref 0.5–1.3)
CREAT SERPL-MCNC: 0.69 MG/DL (ref 0.5–1.3)
CREAT SERPL-MCNC: 0.72 MG/DL (ref 0.5–1.3)
EGFRCR SERPLBLD CKD-EPI 2021: >90 ML/MIN/1.73M*2
EOSINOPHIL # BLD AUTO: 0.02 X10*3/UL (ref 0–0.7)
EOSINOPHIL # BLD AUTO: 0.05 X10*3/UL (ref 0–0.7)
EOSINOPHIL NFR BLD AUTO: 0.2 %
EOSINOPHIL NFR BLD AUTO: 0.5 %
ERYTHROCYTE [DISTWIDTH] IN BLOOD BY AUTOMATED COUNT: 13.1 % (ref 11.5–14.5)
ERYTHROCYTE [DISTWIDTH] IN BLOOD BY AUTOMATED COUNT: 13.2 % (ref 11.5–14.5)
EST. AVERAGE GLUCOSE BLD GHB EST-MCNC: 97 MG/DL
ETHANOL SERPL-MCNC: <10 MG/DL
FENTANYL+NORFENTANYL UR QL SCN: ABNORMAL
GLUCOSE BLD MANUAL STRIP-MCNC: 97 MG/DL (ref 74–99)
GLUCOSE BLD MANUAL STRIP-MCNC: 97 MG/DL (ref 74–99)
GLUCOSE BLDV-MCNC: 100 MG/DL (ref 74–99)
GLUCOSE SERPL-MCNC: 101 MG/DL (ref 74–99)
GLUCOSE SERPL-MCNC: 89 MG/DL (ref 74–99)
GLUCOSE SERPL-MCNC: 94 MG/DL (ref 74–99)
GLUCOSE UR STRIP.AUTO-MCNC: NORMAL MG/DL
HBA1C MFR BLD: 5 % (ref ?–5.7)
HCO3 BLDV-SCNC: 23 MMOL/L (ref 22–26)
HCT VFR BLD AUTO: 32 % (ref 41–52)
HCT VFR BLD AUTO: 38 % (ref 41–52)
HCT VFR BLD EST: 31 % (ref 41–52)
HGB BLD-MCNC: 11.5 G/DL (ref 13.5–17.5)
HGB BLD-MCNC: 13.7 G/DL (ref 13.5–17.5)
HGB BLDV-MCNC: 10.3 G/DL (ref 13.5–17.5)
IMM GRANULOCYTES # BLD AUTO: 0.06 X10*3/UL (ref 0–0.7)
IMM GRANULOCYTES # BLD AUTO: 0.06 X10*3/UL (ref 0–0.7)
IMM GRANULOCYTES NFR BLD AUTO: 0.5 % (ref 0–0.9)
IMM GRANULOCYTES NFR BLD AUTO: 0.6 % (ref 0–0.9)
INHALED O2 CONCENTRATION: 28 %
INR PPP: 1 (ref 0.9–1.1)
INR PPP: 1.1 (ref 0.9–1.1)
KETONES UR STRIP.AUTO-MCNC: ABNORMAL MG/DL
LACTATE BLDV-SCNC: 1.4 MMOL/L (ref 0.4–2)
LACTATE SERPL-SCNC: 1.3 MMOL/L (ref 0.4–2)
LEUKOCYTE ESTERASE UR QL STRIP.AUTO: ABNORMAL
LYMPHOCYTES # BLD AUTO: 1.11 X10*3/UL (ref 1.2–4.8)
LYMPHOCYTES # BLD AUTO: 1.43 X10*3/UL (ref 1.2–4.8)
LYMPHOCYTES NFR BLD AUTO: 14.9 %
LYMPHOCYTES NFR BLD AUTO: 9.2 %
MAGNESIUM SERPL-MCNC: 1.29 MG/DL (ref 1.6–2.4)
MAGNESIUM SERPL-MCNC: 1.46 MG/DL (ref 1.6–2.4)
MAGNESIUM SERPL-MCNC: 2.26 MG/DL (ref 1.6–2.4)
MCH RBC QN AUTO: 32.8 PG (ref 26–34)
MCH RBC QN AUTO: 33 PG (ref 26–34)
MCHC RBC AUTO-ENTMCNC: 35.9 G/DL (ref 32–36)
MCHC RBC AUTO-ENTMCNC: 36.1 G/DL (ref 32–36)
MCV RBC AUTO: 91 FL (ref 80–100)
MCV RBC AUTO: 92 FL (ref 80–100)
METHADONE UR QL SCN: ABNORMAL
MONOCYTES # BLD AUTO: 0.6 X10*3/UL (ref 0.1–1)
MONOCYTES # BLD AUTO: 0.61 X10*3/UL (ref 0.1–1)
MONOCYTES NFR BLD AUTO: 5.1 %
MONOCYTES NFR BLD AUTO: 6.2 %
MUCOUS THREADS #/AREA URNS AUTO: ABNORMAL /LPF
NEUTROPHILS # BLD AUTO: 10.16 X10*3/UL (ref 1.2–7.7)
NEUTROPHILS # BLD AUTO: 7.43 X10*3/UL (ref 1.2–7.7)
NEUTROPHILS NFR BLD AUTO: 77.4 %
NEUTROPHILS NFR BLD AUTO: 84.6 %
NITRITE UR QL STRIP.AUTO: NEGATIVE
NRBC BLD-RTO: 0 /100 WBCS (ref 0–0)
NRBC BLD-RTO: 0 /100 WBCS (ref 0–0)
OPIATES UR QL SCN: ABNORMAL
OXYCODONE+OXYMORPHONE UR QL SCN: ABNORMAL
OXYHGB MFR BLDV: 85.3 % (ref 45–75)
PCO2 BLDV: 38 MM HG (ref 41–51)
PCP UR QL SCN: ABNORMAL
PERFORMED ON: NORMAL
PH BLDV: 7.39 PH (ref 7.33–7.43)
PH UR STRIP.AUTO: 6 [PH]
PHOSPHATE SERPL-MCNC: 2.6 MG/DL (ref 2.5–4.9)
PHOSPHATE SERPL-MCNC: 3.2 MG/DL (ref 2.5–4.9)
PLATELET # BLD AUTO: 133 X10*3/UL (ref 150–450)
PLATELET # BLD AUTO: 193 X10*3/UL (ref 150–450)
PO2 BLDV: 54 MM HG (ref 35–45)
POC CREATININE: 0.9 MG/DL (ref 0.8–1.3)
POC SAMPLE TYPE: NORMAL
POTASSIUM BLDV-SCNC: 4.5 MMOL/L (ref 3.5–5.3)
POTASSIUM SERPL-SCNC: 3.1 MMOL/L (ref 3.5–5.3)
POTASSIUM SERPL-SCNC: 3.4 MMOL/L (ref 3.5–5.3)
POTASSIUM SERPL-SCNC: 3.4 MMOL/L (ref 3.5–5.3)
PROT SERPL-MCNC: 7.2 G/DL (ref 6.4–8.2)
PROT UR STRIP.AUTO-MCNC: ABNORMAL MG/DL
PROTHROMBIN TIME: 11.5 SECONDS (ref 9.8–12.4)
PROTHROMBIN TIME: 12 SECONDS (ref 9.8–12.4)
RBC # BLD AUTO: 3.48 X10*6/UL (ref 4.5–5.9)
RBC # BLD AUTO: 4.18 X10*6/UL (ref 4.5–5.9)
RBC # UR STRIP.AUTO: NEGATIVE MG/DL
RBC #/AREA URNS AUTO: ABNORMAL /HPF
RH FACTOR (ANTIGEN D): NORMAL
RH FACTOR (ANTIGEN D): NORMAL
SAO2 % BLDV: 88 % (ref 45–75)
SODIUM BLDV-SCNC: 137 MMOL/L (ref 136–145)
SODIUM SERPL-SCNC: 137 MMOL/L (ref 136–145)
SODIUM SERPL-SCNC: 140 MMOL/L (ref 136–145)
SODIUM SERPL-SCNC: 142 MMOL/L (ref 136–145)
SP GR UR STRIP.AUTO: 1.05
SQUAMOUS #/AREA URNS AUTO: ABNORMAL /HPF
TSH SERPL-ACNC: 0.54 MIU/L (ref 0.44–3.98)
UROBILINOGEN UR STRIP.AUTO-MCNC: NORMAL MG/DL
WBC # BLD AUTO: 12 X10*3/UL (ref 4.4–11.3)
WBC # BLD AUTO: 9.6 X10*3/UL (ref 4.4–11.3)
WBC #/AREA URNS AUTO: ABNORMAL /HPF

## 2025-09-07 PROCEDURE — 84132 ASSAY OF SERUM POTASSIUM: CPT | Performed by: INTERNAL MEDICINE

## 2025-09-07 PROCEDURE — 84443 ASSAY THYROID STIM HORMONE: CPT

## 2025-09-07 PROCEDURE — 99291 CRITICAL CARE FIRST HOUR: CPT | Performed by: EMERGENCY MEDICINE

## 2025-09-07 PROCEDURE — 2500000004 HC RX 250 GENERAL PHARMACY W/ HCPCS (ALT 636 FOR OP/ED): Performed by: EMERGENCY MEDICINE

## 2025-09-07 PROCEDURE — 37799 UNLISTED PX VASCULAR SURGERY: CPT

## 2025-09-07 PROCEDURE — 2500000002 HC RX 250 W HCPCS SELF ADMINISTERED DRUGS (ALT 637 FOR MEDICARE OP, ALT 636 FOR OP/ED)

## 2025-09-07 PROCEDURE — 2020000001 HC ICU ROOM DAILY

## 2025-09-07 PROCEDURE — 85025 COMPLETE CBC W/AUTO DIFF WBC: CPT | Performed by: EMERGENCY MEDICINE

## 2025-09-07 PROCEDURE — 81001 URINALYSIS AUTO W/SCOPE: CPT

## 2025-09-07 PROCEDURE — 84132 ASSAY OF SERUM POTASSIUM: CPT

## 2025-09-07 PROCEDURE — 85730 THROMBOPLASTIN TIME PARTIAL: CPT | Performed by: INTERNAL MEDICINE

## 2025-09-07 PROCEDURE — 83036 HEMOGLOBIN GLYCOSYLATED A1C: CPT

## 2025-09-07 PROCEDURE — 85025 COMPLETE CBC W/AUTO DIFF WBC: CPT | Performed by: INTERNAL MEDICINE

## 2025-09-07 PROCEDURE — 2500000004 HC RX 250 GENERAL PHARMACY W/ HCPCS (ALT 636 FOR OP/ED)

## 2025-09-07 PROCEDURE — 80053 COMPREHEN METABOLIC PANEL: CPT | Performed by: EMERGENCY MEDICINE

## 2025-09-07 PROCEDURE — 80307 DRUG TEST PRSMV CHEM ANLYZR: CPT

## 2025-09-07 PROCEDURE — 84484 ASSAY OF TROPONIN QUANT: CPT | Performed by: EMERGENCY MEDICINE

## 2025-09-07 PROCEDURE — 86901 BLOOD TYPING SEROLOGIC RH(D): CPT | Performed by: INTERNAL MEDICINE

## 2025-09-07 PROCEDURE — 85610 PROTHROMBIN TIME: CPT | Performed by: EMERGENCY MEDICINE

## 2025-09-07 PROCEDURE — 83735 ASSAY OF MAGNESIUM: CPT | Performed by: EMERGENCY MEDICINE

## 2025-09-07 PROCEDURE — 94760 N-INVAS EAR/PLS OXIMETRY 1: CPT

## 2025-09-07 PROCEDURE — 86901 BLOOD TYPING SEROLOGIC RH(D): CPT | Performed by: EMERGENCY MEDICINE

## 2025-09-07 PROCEDURE — 83735 ASSAY OF MAGNESIUM: CPT

## 2025-09-07 PROCEDURE — 2500000001 HC RX 250 WO HCPCS SELF ADMINISTERED DRUGS (ALT 637 FOR MEDICARE OP)

## 2025-09-07 PROCEDURE — 83605 ASSAY OF LACTIC ACID: CPT | Performed by: INTERNAL MEDICINE

## 2025-09-07 PROCEDURE — 36415 COLL VENOUS BLD VENIPUNCTURE: CPT | Performed by: EMERGENCY MEDICINE

## 2025-09-07 PROCEDURE — 82947 ASSAY GLUCOSE BLOOD QUANT: CPT

## 2025-09-07 PROCEDURE — 82077 ASSAY SPEC XCP UR&BREATH IA: CPT

## 2025-09-07 PROCEDURE — 83735 ASSAY OF MAGNESIUM: CPT | Performed by: INTERNAL MEDICINE

## 2025-09-07 RX ORDER — MAGNESIUM SULFATE HEPTAHYDRATE 40 MG/ML
4 INJECTION, SOLUTION INTRAVENOUS ONCE
Status: COMPLETED | OUTPATIENT
Start: 2025-09-07 | End: 2025-09-07

## 2025-09-07 RX ORDER — POTASSIUM CHLORIDE 20 MEQ/1
20 TABLET, EXTENDED RELEASE ORAL ONCE
Status: COMPLETED | OUTPATIENT
Start: 2025-09-07 | End: 2025-09-07

## 2025-09-07 RX ORDER — POLYETHYLENE GLYCOL 3350 17 G/17G
17 POWDER, FOR SOLUTION ORAL DAILY
Status: DISPENSED | OUTPATIENT
Start: 2025-09-07

## 2025-09-07 RX ORDER — DEXTROSE 50 % IN WATER (D50W) INTRAVENOUS SYRINGE
12.5
Status: ACTIVE | OUTPATIENT
Start: 2025-09-07

## 2025-09-07 RX ORDER — MAGNESIUM SULFATE HEPTAHYDRATE 40 MG/ML
2 INJECTION, SOLUTION INTRAVENOUS ONCE
Status: COMPLETED | OUTPATIENT
Start: 2025-09-07 | End: 2025-09-07

## 2025-09-07 RX ORDER — POTASSIUM CHLORIDE 14.9 MG/ML
20 INJECTION INTRAVENOUS
Status: COMPLETED | OUTPATIENT
Start: 2025-09-07 | End: 2025-09-07

## 2025-09-07 RX ORDER — NICARDIPINE HYDROCHLORIDE 0.2 MG/ML
0-15 INJECTION, SOLUTION INTRAVENOUS CONTINUOUS
Status: DISCONTINUED | OUTPATIENT
Start: 2025-09-07 | End: 2025-09-07

## 2025-09-07 RX ORDER — ATORVASTATIN CALCIUM 40 MG/1
40 TABLET, FILM COATED ORAL DAILY
Status: DISPENSED | OUTPATIENT
Start: 2025-09-07

## 2025-09-07 RX ORDER — DEXTROSE 50 % IN WATER (D50W) INTRAVENOUS SYRINGE
25
Status: ACTIVE | OUTPATIENT
Start: 2025-09-07

## 2025-09-07 RX ORDER — SPIRONOLACTONE 25 MG/1
50 TABLET ORAL DAILY
Status: DISPENSED | OUTPATIENT
Start: 2025-09-07

## 2025-09-07 RX ORDER — MORPHINE SULFATE 4 MG/ML
4 INJECTION INTRAVENOUS ONCE
Status: COMPLETED | OUTPATIENT
Start: 2025-09-07 | End: 2025-09-07

## 2025-09-07 RX ORDER — LABETALOL HYDROCHLORIDE 5 MG/ML
20 INJECTION, SOLUTION INTRAVENOUS ONCE
Status: ACTIVE | OUTPATIENT
Start: 2025-09-07

## 2025-09-07 RX ORDER — HYDRALAZINE HYDROCHLORIDE 20 MG/ML
5 INJECTION INTRAMUSCULAR; INTRAVENOUS ONCE
Status: COMPLETED | OUTPATIENT
Start: 2025-09-07 | End: 2025-09-07

## 2025-09-07 RX ORDER — NICARDIPINE HYDROCHLORIDE 0.2 MG/ML
INJECTION, SOLUTION INTRAVENOUS
Status: COMPLETED
Start: 2025-09-07 | End: 2025-09-07

## 2025-09-07 RX ORDER — POTASSIUM CHLORIDE 20 MEQ/1
40 TABLET, EXTENDED RELEASE ORAL ONCE
Status: COMPLETED | OUTPATIENT
Start: 2025-09-07 | End: 2025-09-07

## 2025-09-07 RX ORDER — CYCLOBENZAPRINE HCL 10 MG
10 TABLET ORAL NIGHTLY PRN
Status: ACTIVE | OUTPATIENT
Start: 2025-09-07

## 2025-09-07 RX ORDER — CARVEDILOL 3.12 MG/1
3.12 TABLET ORAL 2 TIMES DAILY
Status: DISPENSED | OUTPATIENT
Start: 2025-09-07

## 2025-09-07 RX ORDER — PANTOPRAZOLE SODIUM 20 MG/1
20 TABLET, DELAYED RELEASE ORAL
Status: DISPENSED | OUTPATIENT
Start: 2025-09-07

## 2025-09-07 RX ORDER — VANCOMYCIN HYDROCHLORIDE 1 G/20ML
INJECTION, POWDER, LYOPHILIZED, FOR SOLUTION INTRAVENOUS DAILY PRN
Status: DISPENSED | OUTPATIENT
Start: 2025-09-07

## 2025-09-07 RX ORDER — INSULIN LISPRO 100 [IU]/ML
0-5 INJECTION, SOLUTION INTRAVENOUS; SUBCUTANEOUS EVERY 4 HOURS
Status: DISCONTINUED | OUTPATIENT
Start: 2025-09-07 | End: 2025-09-07

## 2025-09-07 RX ORDER — VANCOMYCIN 2 GRAM/500 ML IN 0.9 % SODIUM CHLORIDE INTRAVENOUS
2000 EVERY 12 HOURS
Status: DISPENSED | OUTPATIENT
Start: 2025-09-07

## 2025-09-07 RX ORDER — LISINOPRIL 40 MG/1
40 TABLET ORAL DAILY
Status: DISPENSED | OUTPATIENT
Start: 2025-09-07

## 2025-09-07 RX ORDER — ENOXAPARIN SODIUM 100 MG/ML
40 INJECTION SUBCUTANEOUS EVERY 12 HOURS SCHEDULED
Status: DISPENSED | OUTPATIENT
Start: 2025-09-07

## 2025-09-07 RX ORDER — SENNOSIDES 8.6 MG/1
1 TABLET ORAL 2 TIMES DAILY
Status: DISPENSED | OUTPATIENT
Start: 2025-09-07

## 2025-09-07 RX ADMIN — ATORVASTATIN CALCIUM 40 MG: 40 TABLET, FILM COATED ORAL at 08:01

## 2025-09-07 RX ADMIN — ENOXAPARIN SODIUM 40 MG: 100 INJECTION SUBCUTANEOUS at 08:02

## 2025-09-07 RX ADMIN — POTASSIUM CHLORIDE 20 MEQ: 14.9 INJECTION, SOLUTION INTRAVENOUS at 08:51

## 2025-09-07 RX ADMIN — PIPERACILLIN SODIUM AND TAZOBACTAM SODIUM 4.5 G: 4; .5 INJECTION, SOLUTION INTRAVENOUS at 20:36

## 2025-09-07 RX ADMIN — PIPERACILLIN SODIUM AND TAZOBACTAM SODIUM 4.5 G: 4; .5 INJECTION, SOLUTION INTRAVENOUS at 13:48

## 2025-09-07 RX ADMIN — LABETALOL HYDROCHLORIDE 1.5 MG/MIN: 5 INJECTION, SOLUTION INTRAVENOUS at 11:28

## 2025-09-07 RX ADMIN — PIPERACILLIN SODIUM AND TAZOBACTAM SODIUM 4.5 G: 4; .5 INJECTION, SOLUTION INTRAVENOUS at 08:03

## 2025-09-07 RX ADMIN — HYDRALAZINE HYDROCHLORIDE 5 MG: 20 INJECTION INTRAMUSCULAR; INTRAVENOUS at 02:58

## 2025-09-07 RX ADMIN — NICARDIPINE HYDROCHLORIDE 15 MG/HR: 0.2 INJECTION, SOLUTION INTRAVENOUS at 10:38

## 2025-09-07 RX ADMIN — MAGNESIUM SULFATE HEPTAHYDRATE 2 G: 40 INJECTION, SOLUTION INTRAVENOUS at 04:03

## 2025-09-07 RX ADMIN — NICARDIPINE HYDROCHLORIDE 2.5 MG/HR: 0.2 INJECTION, SOLUTION INTRAVENOUS at 04:52

## 2025-09-07 RX ADMIN — CARVEDILOL 3.12 MG: 3.12 TABLET, FILM COATED ORAL at 20:36

## 2025-09-07 RX ADMIN — Medication 2000 MG: at 21:27

## 2025-09-07 RX ADMIN — POTASSIUM CHLORIDE 20 MEQ: 1500 TABLET, EXTENDED RELEASE ORAL at 20:37

## 2025-09-07 RX ADMIN — SPIRONOLACTONE 50 MG: 25 TABLET, FILM COATED ORAL at 08:01

## 2025-09-07 RX ADMIN — NICARDIPINE HYDROCHLORIDE 15 MG/HR: 0.2 INJECTION, SOLUTION INTRAVENOUS at 08:13

## 2025-09-07 RX ADMIN — MAGNESIUM SULFATE HEPTAHYDRATE 4 G: 40 INJECTION, SOLUTION INTRAVENOUS at 08:51

## 2025-09-07 RX ADMIN — POLYETHYLENE GLYCOL 3350 17 G: 17 POWDER, FOR SOLUTION ORAL at 08:02

## 2025-09-07 RX ADMIN — LISINOPRIL 40 MG: 40 TABLET ORAL at 08:02

## 2025-09-07 RX ADMIN — MORPHINE SULFATE 4 MG: 4 INJECTION INTRAVENOUS at 03:58

## 2025-09-07 RX ADMIN — POTASSIUM CHLORIDE 20 MEQ: 14.9 INJECTION, SOLUTION INTRAVENOUS at 12:50

## 2025-09-07 RX ADMIN — CARVEDILOL 3.12 MG: 3.12 TABLET, FILM COATED ORAL at 12:53

## 2025-09-07 RX ADMIN — SENNOSIDES 8.6 MG: 8.6 TABLET, FILM COATED ORAL at 08:02

## 2025-09-07 RX ADMIN — Medication 2000 MG: at 08:03

## 2025-09-07 RX ADMIN — PANTOPRAZOLE SODIUM 20 MG: 20 TABLET, DELAYED RELEASE ORAL at 08:13

## 2025-09-07 RX ADMIN — POTASSIUM CHLORIDE 40 MEQ: 1500 TABLET, EXTENDED RELEASE ORAL at 20:36

## 2025-09-07 RX ADMIN — PIPERACILLIN SODIUM AND TAZOBACTAM SODIUM 4.5 G: 4; .5 INJECTION, SOLUTION INTRAVENOUS at 02:33

## 2025-09-07 SDOH — SOCIAL STABILITY: SOCIAL INSECURITY
WITHIN THE LAST YEAR, HAVE YOU BEEN RAPED OR FORCED TO HAVE ANY KIND OF SEXUAL ACTIVITY BY YOUR PARTNER OR EX-PARTNER?: NO

## 2025-09-07 SDOH — ECONOMIC STABILITY: FOOD INSECURITY: WITHIN THE PAST 12 MONTHS, YOU WORRIED THAT YOUR FOOD WOULD RUN OUT BEFORE YOU GOT THE MONEY TO BUY MORE.: NEVER TRUE

## 2025-09-07 SDOH — ECONOMIC STABILITY: HOUSING INSECURITY: IN THE LAST 12 MONTHS, WAS THERE A TIME WHEN YOU WERE NOT ABLE TO PAY THE MORTGAGE OR RENT ON TIME?: NO

## 2025-09-07 SDOH — ECONOMIC STABILITY: INCOME INSECURITY: IN THE PAST 12 MONTHS HAS THE ELECTRIC, GAS, OIL, OR WATER COMPANY THREATENED TO SHUT OFF SERVICES IN YOUR HOME?: NO

## 2025-09-07 SDOH — ECONOMIC STABILITY: FOOD INSECURITY: WITHIN THE PAST 12 MONTHS, THE FOOD YOU BOUGHT JUST DIDN'T LAST AND YOU DIDN'T HAVE MONEY TO GET MORE.: NEVER TRUE

## 2025-09-07 SDOH — SOCIAL STABILITY: SOCIAL INSECURITY: DOES ANYONE TRY TO KEEP YOU FROM HAVING/CONTACTING OTHER FRIENDS OR DOING THINGS OUTSIDE YOUR HOME?: NO

## 2025-09-07 SDOH — SOCIAL STABILITY: SOCIAL INSECURITY: WITHIN THE LAST YEAR, HAVE YOU BEEN AFRAID OF YOUR PARTNER OR EX-PARTNER?: NO

## 2025-09-07 SDOH — SOCIAL STABILITY: SOCIAL INSECURITY
WITHIN THE LAST YEAR, HAVE YOU BEEN KICKED, HIT, SLAPPED, OR OTHERWISE PHYSICALLY HURT BY YOUR PARTNER OR EX-PARTNER?: NO

## 2025-09-07 SDOH — SOCIAL STABILITY: SOCIAL INSECURITY: HAS ANYONE EVER THREATENED TO HURT YOUR FAMILY OR YOUR PETS?: NO

## 2025-09-07 SDOH — ECONOMIC STABILITY: HOUSING INSECURITY: IN THE PAST 12 MONTHS, HOW MANY TIMES HAVE YOU MOVED WHERE YOU WERE LIVING?: 0

## 2025-09-07 SDOH — SOCIAL STABILITY: SOCIAL INSECURITY: HAVE YOU HAD ANY THOUGHTS OF HARMING ANYONE ELSE?: NO

## 2025-09-07 SDOH — SOCIAL STABILITY: SOCIAL INSECURITY: WITHIN THE LAST YEAR, HAVE YOU BEEN HUMILIATED OR EMOTIONALLY ABUSED IN OTHER WAYS BY YOUR PARTNER OR EX-PARTNER?: NO

## 2025-09-07 SDOH — ECONOMIC STABILITY: HOUSING INSECURITY: AT ANY TIME IN THE PAST 12 MONTHS, WERE YOU HOMELESS OR LIVING IN A SHELTER (INCLUDING NOW)?: NO

## 2025-09-07 SDOH — SOCIAL STABILITY: SOCIAL INSECURITY: ABUSE: ADULT

## 2025-09-07 SDOH — ECONOMIC STABILITY: FOOD INSECURITY: HOW HARD IS IT FOR YOU TO PAY FOR THE VERY BASICS LIKE FOOD, HOUSING, MEDICAL CARE, AND HEATING?: NOT HARD AT ALL

## 2025-09-07 SDOH — SOCIAL STABILITY: SOCIAL INSECURITY: DO YOU FEEL ANYONE HAS EXPLOITED OR TAKEN ADVANTAGE OF YOU FINANCIALLY OR OF YOUR PERSONAL PROPERTY?: NO

## 2025-09-07 SDOH — ECONOMIC STABILITY: TRANSPORTATION INSECURITY: IN THE PAST 12 MONTHS, HAS LACK OF TRANSPORTATION KEPT YOU FROM MEDICAL APPOINTMENTS OR FROM GETTING MEDICATIONS?: NO

## 2025-09-07 SDOH — SOCIAL STABILITY: SOCIAL INSECURITY: DO YOU FEEL UNSAFE GOING BACK TO THE PLACE WHERE YOU ARE LIVING?: NO

## 2025-09-07 SDOH — SOCIAL STABILITY: SOCIAL INSECURITY: ARE THERE ANY APPARENT SIGNS OF INJURIES/BEHAVIORS THAT COULD BE RELATED TO ABUSE/NEGLECT?: NO

## 2025-09-07 SDOH — SOCIAL STABILITY: SOCIAL INSECURITY: HAVE YOU HAD THOUGHTS OF HARMING ANYONE ELSE?: NO

## 2025-09-07 SDOH — SOCIAL STABILITY: SOCIAL INSECURITY: ARE YOU OR HAVE YOU BEEN THREATENED OR ABUSED PHYSICALLY, EMOTIONALLY, OR SEXUALLY BY ANYONE?: NO

## 2025-09-07 ASSESSMENT — PAIN SCALES - GENERAL
PAINLEVEL_OUTOF10: 3
PAINLEVEL_OUTOF10: 0 - NO PAIN

## 2025-09-07 ASSESSMENT — LIFESTYLE VARIABLES
HEADACHE, FULLNESS IN HEAD: NOT PRESENT
PAROXYSMAL SWEATS: NO SWEAT VISIBLE
AUDITORY DISTURBANCES: NOT PRESENT
ORIENTATION AND CLOUDING OF SENSORIUM: ORIENTED AND CAN DO SERIAL ADDITIONS
ORIENTATION AND CLOUDING OF SENSORIUM: ORIENTED AND CAN DO SERIAL ADDITIONS
AGITATION: NORMAL ACTIVITY
TOTAL SCORE: 0
HOW OFTEN DO YOU HAVE A DRINK CONTAINING ALCOHOL: NEVER
VISUAL DISTURBANCES: NOT PRESENT
AUDIT-C TOTAL SCORE: 0
AUDIT-C TOTAL SCORE: 0
AGITATION: NORMAL ACTIVITY
ANXIETY: NO ANXIETY, AT EASE
SKIP TO QUESTIONS 9-10: 1
SUBSTANCE_ABUSE_PAST_12_MONTHS: NO
ANXIETY: NO ANXIETY, AT EASE
NAUSEA AND VOMITING: NO NAUSEA AND NO VOMITING
VISUAL DISTURBANCES: NOT PRESENT
PRESCIPTION_ABUSE_PAST_12_MONTHS: NO
AUDITORY DISTURBANCES: NOT PRESENT
HAVE YOU EVER FELT YOU SHOULD CUT DOWN ON YOUR DRINKING: NO
EVER HAD A DRINK FIRST THING IN THE MORNING TO STEADY YOUR NERVES TO GET RID OF A HANGOVER: NO
PAROXYSMAL SWEATS: NO SWEAT VISIBLE
NAUSEA AND VOMITING: NO NAUSEA AND NO VOMITING
VISUAL DISTURBANCES: NOT PRESENT
ANXIETY: NO ANXIETY, AT EASE
HOW OFTEN DO YOU HAVE 6 OR MORE DRINKS ON ONE OCCASION: NEVER
AUDITORY DISTURBANCES: NOT PRESENT
TOTAL SCORE: 0
TREMOR: NO TREMOR
NAUSEA AND VOMITING: NO NAUSEA AND NO VOMITING
TREMOR: NO TREMOR
EVER FELT BAD OR GUILTY ABOUT YOUR DRINKING: NO
PAROXYSMAL SWEATS: NO SWEAT VISIBLE
HEADACHE, FULLNESS IN HEAD: NOT PRESENT
TOTAL SCORE: 0
AGITATION: NORMAL ACTIVITY
TREMOR: NO TREMOR
HEADACHE, FULLNESS IN HEAD: NOT PRESENT
HOW MANY STANDARD DRINKS CONTAINING ALCOHOL DO YOU HAVE ON A TYPICAL DAY: PATIENT DOES NOT DRINK
HAVE PEOPLE ANNOYED YOU BY CRITICIZING YOUR DRINKING: NO
ORIENTATION AND CLOUDING OF SENSORIUM: ORIENTED AND CAN DO SERIAL ADDITIONS
TOTAL SCORE: 0

## 2025-09-07 ASSESSMENT — PATIENT HEALTH QUESTIONNAIRE - PHQ9
SUM OF ALL RESPONSES TO PHQ9 QUESTIONS 1 & 2: 0
1. LITTLE INTEREST OR PLEASURE IN DOING THINGS: NOT AT ALL
2. FEELING DOWN, DEPRESSED OR HOPELESS: NOT AT ALL

## 2025-09-07 ASSESSMENT — COGNITIVE AND FUNCTIONAL STATUS - GENERAL
DAILY ACTIVITIY SCORE: 24
MOBILITY SCORE: 24
PATIENT BASELINE BEDBOUND: NO

## 2025-09-07 ASSESSMENT — ACTIVITIES OF DAILY LIVING (ADL)
DRESSING YOURSELF: INDEPENDENT
PATIENT'S MEMORY ADEQUATE TO SAFELY COMPLETE DAILY ACTIVITIES?: YES
BATHING: INDEPENDENT
TOILETING: INDEPENDENT
ADEQUATE_TO_COMPLETE_ADL: YES
WALKS IN HOME: INDEPENDENT
FEEDING YOURSELF: INDEPENDENT
HEARING - RIGHT EAR: FUNCTIONAL
LACK_OF_TRANSPORTATION: NO
HEARING - LEFT EAR: FUNCTIONAL
GROOMING: INDEPENDENT
JUDGMENT_ADEQUATE_SAFELY_COMPLETE_DAILY_ACTIVITIES: YES

## 2025-09-07 ASSESSMENT — PAIN - FUNCTIONAL ASSESSMENT
PAIN_FUNCTIONAL_ASSESSMENT: 0-10

## 2025-09-07 ASSESSMENT — PAIN DESCRIPTION - DESCRIPTORS: DESCRIPTORS: PRESSURE

## 2025-09-08 VITALS
TEMPERATURE: 95.2 F | DIASTOLIC BLOOD PRESSURE: 80 MMHG | HEART RATE: 57 BPM | SYSTOLIC BLOOD PRESSURE: 134 MMHG | WEIGHT: 315 LBS | HEIGHT: 72 IN | RESPIRATION RATE: 17 BRPM | OXYGEN SATURATION: 93 % | BODY MASS INDEX: 42.66 KG/M2

## 2025-10-01 ENCOUNTER — APPOINTMENT (OUTPATIENT)
Dept: ORTHOPEDIC SURGERY | Facility: CLINIC | Age: 65
End: 2025-10-01
Payer: COMMERCIAL

## 2026-01-14 ENCOUNTER — APPOINTMENT (OUTPATIENT)
Dept: UROLOGY | Facility: CLINIC | Age: 66
End: 2026-01-14
Payer: COMMERCIAL

## (undated) DEVICE — USAGE FEE F/OSTEOTOME

## (undated) DEVICE — 4-PORT MANIFOLD: Brand: NEPTUNE 2

## (undated) DEVICE — 450 ML BOTTLE OF 0.05% CHLORHEXIDINE GLUCONATE IN 99.95% STERILE WATER FOR IRRIGATION, USP AND APPLICATOR.: Brand: IRRISEPT ANTIMICROBIAL WOUND LAVAGE

## (undated) DEVICE — Device

## (undated) DEVICE — DRAPE,TOP,102X53,STERILE: Brand: MEDLINE

## (undated) DEVICE — GLOVE ORANGE PI 7 1/2   MSG9075

## (undated) DEVICE — Device: Brand: STABLECUT®

## (undated) DEVICE — STERILE PATIENT PROTECTIVE PAD FOR IMP® KNEE POSITIONERS & COHESIVE WRAP (10 / CASE): Brand: DE MAYO KNEE POSITIONER®

## (undated) DEVICE — SUTURE VICRYL SZ 1 L36IN ABSRB UD L36MM CT-1 1/2 CIR J947H

## (undated) DEVICE — SHEET, DRAPE, SPLIT, STERILE: Brand: MEDLINE

## (undated) DEVICE — SUTURE MONOCRYL SZ 4-0 L27IN ABSRB UD L19MM PS-2 1/2 CIR PRIM Y426H

## (undated) DEVICE — 3 BONE CEMENT MIXER WITH SPATULA: Brand: MIXEVAC, ACM

## (undated) DEVICE — SPLINT KNEE UNIV FOR LESS THAN 36IN L20IN FOAM LAM E CNTCT

## (undated) DEVICE — SUTURE MONOCRYL + SZ 3-0 L36IN ABSRB UD CT-1 L36MM 1/2 CIR MCP944H

## (undated) DEVICE — DRESSING ANTIMIC FOAM MEPILEX BORD POSTOP AG PROD SZ 4X12 IN

## (undated) DEVICE — GLOVE ORANGE PI 8   MSG9080

## (undated) DEVICE — TOTAL KNEE: Brand: MEDLINE INDUSTRIES, INC.